# Patient Record
Sex: MALE | Race: WHITE | NOT HISPANIC OR LATINO | Employment: OTHER | URBAN - METROPOLITAN AREA
[De-identification: names, ages, dates, MRNs, and addresses within clinical notes are randomized per-mention and may not be internally consistent; named-entity substitution may affect disease eponyms.]

---

## 2019-11-27 ENCOUNTER — APPOINTMENT (EMERGENCY)
Dept: CT IMAGING | Facility: HOSPITAL | Age: 74
DRG: 079 | End: 2019-11-27
Payer: MEDICARE

## 2019-11-27 ENCOUNTER — HOSPITAL ENCOUNTER (INPATIENT)
Facility: HOSPITAL | Age: 74
LOS: 1 days | Discharge: HOME | DRG: 079 | End: 2019-11-29
Attending: EMERGENCY MEDICINE | Admitting: HOSPITALIST
Payer: MEDICARE

## 2019-11-27 DIAGNOSIS — I65.21 STENOSIS OF RIGHT CAROTID ARTERY: ICD-10-CM

## 2019-11-27 DIAGNOSIS — G45.9 TIA (TRANSIENT ISCHEMIC ATTACK): Primary | ICD-10-CM

## 2019-11-27 DIAGNOSIS — I67.4 HYPERTENSIVE ENCEPHALOPATHY: ICD-10-CM

## 2019-11-27 DIAGNOSIS — Z72.0 SMOKELESS TOBACCO USE: ICD-10-CM

## 2019-11-27 DIAGNOSIS — I10 HYPERTENSION: ICD-10-CM

## 2019-11-27 DIAGNOSIS — R20.0 RIGHT SIDED NUMBNESS: ICD-10-CM

## 2019-11-27 DIAGNOSIS — I10 ESSENTIAL HYPERTENSION: Chronic | ICD-10-CM

## 2019-11-27 PROBLEM — R79.89 ELEVATED SERUM CREATININE: Status: ACTIVE | Noted: 2019-11-27

## 2019-11-27 LAB
ANION GAP SERPL CALCULATED.3IONS-SCNC: 7 MMOL/L (ref 4–13)
APTT PPP: 29 SECONDS (ref 23–37)
BASOPHILS # BLD AUTO: 0.1 THOUSANDS/ΜL (ref 0–0.1)
BASOPHILS NFR BLD AUTO: 1 % (ref 0–1)
BUN SERPL-MCNC: 20 MG/DL (ref 5–25)
CALCIUM SERPL-MCNC: 9.1 MG/DL (ref 8.3–10.1)
CHLORIDE SERPL-SCNC: 103 MMOL/L (ref 100–108)
CO2 SERPL-SCNC: 32 MMOL/L (ref 21–32)
CREAT SERPL-MCNC: 1.76 MG/DL (ref 0.6–1.3)
EOSINOPHIL # BLD AUTO: 0.41 THOUSAND/ΜL (ref 0–0.61)
EOSINOPHIL NFR BLD AUTO: 4 % (ref 0–6)
ERYTHROCYTE [DISTWIDTH] IN BLOOD BY AUTOMATED COUNT: 13.9 % (ref 11.6–15.1)
GFR SERPL CREATININE-BSD FRML MDRD: 38 ML/MIN/1.73SQ M
GLUCOSE SERPL-MCNC: 133 MG/DL (ref 65–140)
HCT VFR BLD AUTO: 48.6 % (ref 36.5–49.3)
HGB BLD-MCNC: 16.1 G/DL (ref 12–17)
IMM GRANULOCYTES # BLD AUTO: 0.06 THOUSAND/UL (ref 0–0.2)
IMM GRANULOCYTES NFR BLD AUTO: 1 % (ref 0–2)
INR PPP: 0.85 (ref 0.84–1.19)
LYMPHOCYTES # BLD AUTO: 1.75 THOUSANDS/ΜL (ref 0.6–4.47)
LYMPHOCYTES NFR BLD AUTO: 19 % (ref 14–44)
MAGNESIUM SERPL-MCNC: 2.1 MG/DL (ref 1.6–2.6)
MCH RBC QN AUTO: 29.9 PG (ref 26.8–34.3)
MCHC RBC AUTO-ENTMCNC: 33.1 G/DL (ref 31.4–37.4)
MCV RBC AUTO: 90 FL (ref 82–98)
MONOCYTES # BLD AUTO: 0.84 THOUSAND/ΜL (ref 0.17–1.22)
MONOCYTES NFR BLD AUTO: 9 % (ref 4–12)
NEUTROPHILS # BLD AUTO: 6.13 THOUSANDS/ΜL (ref 1.85–7.62)
NEUTS SEG NFR BLD AUTO: 66 % (ref 43–75)
NRBC BLD AUTO-RTO: 0 /100 WBCS
PLATELET # BLD AUTO: 286 THOUSANDS/UL (ref 149–390)
PMV BLD AUTO: 9.5 FL (ref 8.9–12.7)
POTASSIUM SERPL-SCNC: 4 MMOL/L (ref 3.5–5.3)
PROTHROMBIN TIME: 11.1 SECONDS (ref 11.6–14.5)
RBC # BLD AUTO: 5.38 MILLION/UL (ref 3.88–5.62)
SODIUM SERPL-SCNC: 142 MMOL/L (ref 136–145)
TROPONIN I SERPL-MCNC: <0.02 NG/ML
WBC # BLD AUTO: 9.29 THOUSAND/UL (ref 4.31–10.16)

## 2019-11-27 PROCEDURE — 99220 PR INITIAL OBSERVATION CARE/DAY 70 MINUTES: CPT | Performed by: PHYSICIAN ASSISTANT

## 2019-11-27 PROCEDURE — 85025 COMPLETE CBC W/AUTO DIFF WBC: CPT | Performed by: EMERGENCY MEDICINE

## 2019-11-27 PROCEDURE — 84484 ASSAY OF TROPONIN QUANT: CPT | Performed by: EMERGENCY MEDICINE

## 2019-11-27 PROCEDURE — 70496 CT ANGIOGRAPHY HEAD: CPT

## 2019-11-27 PROCEDURE — 85730 THROMBOPLASTIN TIME PARTIAL: CPT | Performed by: EMERGENCY MEDICINE

## 2019-11-27 PROCEDURE — 70498 CT ANGIOGRAPHY NECK: CPT

## 2019-11-27 PROCEDURE — 99284 EMERGENCY DEPT VISIT MOD MDM: CPT | Performed by: EMERGENCY MEDICINE

## 2019-11-27 PROCEDURE — 93005 ELECTROCARDIOGRAM TRACING: CPT

## 2019-11-27 PROCEDURE — 99285 EMERGENCY DEPT VISIT HI MDM: CPT

## 2019-11-27 PROCEDURE — 83735 ASSAY OF MAGNESIUM: CPT | Performed by: EMERGENCY MEDICINE

## 2019-11-27 PROCEDURE — 85610 PROTHROMBIN TIME: CPT | Performed by: EMERGENCY MEDICINE

## 2019-11-27 PROCEDURE — 1124F ACP DISCUSS-NO DSCNMKR DOCD: CPT | Performed by: PHYSICIAN ASSISTANT

## 2019-11-27 PROCEDURE — 36415 COLL VENOUS BLD VENIPUNCTURE: CPT | Performed by: EMERGENCY MEDICINE

## 2019-11-27 PROCEDURE — 80048 BASIC METABOLIC PNL TOTAL CA: CPT | Performed by: EMERGENCY MEDICINE

## 2019-11-27 RX ORDER — ASPIRIN 325 MG
325 TABLET ORAL ONCE
Status: COMPLETED | OUTPATIENT
Start: 2019-11-27 | End: 2019-11-27

## 2019-11-27 RX ORDER — AMLODIPINE BESYLATE 5 MG/1
5 TABLET ORAL ONCE
Status: COMPLETED | OUTPATIENT
Start: 2019-11-27 | End: 2019-11-27

## 2019-11-27 RX ORDER — ALPRAZOLAM 0.25 MG/1
0.25 TABLET ORAL
Status: DISCONTINUED | OUTPATIENT
Start: 2019-11-27 | End: 2019-11-28

## 2019-11-27 RX ORDER — AMLODIPINE BESYLATE 5 MG/1
5 TABLET ORAL DAILY
COMMUNITY
End: 2019-11-29 | Stop reason: HOSPADM

## 2019-11-27 RX ORDER — HEPARIN SODIUM 5000 [USP'U]/ML
5000 INJECTION, SOLUTION INTRAVENOUS; SUBCUTANEOUS EVERY 8 HOURS SCHEDULED
Status: DISCONTINUED | OUTPATIENT
Start: 2019-11-27 | End: 2019-11-29 | Stop reason: HOSPADM

## 2019-11-27 RX ORDER — ASPIRIN 81 MG/1
81 TABLET, CHEWABLE ORAL DAILY
Status: DISCONTINUED | OUTPATIENT
Start: 2019-11-28 | End: 2019-11-29 | Stop reason: HOSPADM

## 2019-11-27 RX ORDER — ATORVASTATIN CALCIUM 40 MG/1
40 TABLET, FILM COATED ORAL EVERY EVENING
Status: DISCONTINUED | OUTPATIENT
Start: 2019-11-27 | End: 2019-11-29 | Stop reason: HOSPADM

## 2019-11-27 RX ORDER — ONDANSETRON 2 MG/ML
4 INJECTION INTRAMUSCULAR; INTRAVENOUS EVERY 6 HOURS PRN
Status: DISCONTINUED | OUTPATIENT
Start: 2019-11-27 | End: 2019-11-29 | Stop reason: HOSPADM

## 2019-11-27 RX ORDER — ALPRAZOLAM 0.25 MG/1
TABLET ORAL
COMMUNITY

## 2019-11-27 RX ORDER — AMLODIPINE BESYLATE 5 MG/1
5 TABLET ORAL DAILY
Status: DISCONTINUED | OUTPATIENT
Start: 2019-11-28 | End: 2019-11-28

## 2019-11-27 RX ORDER — ACETAMINOPHEN 325 MG/1
650 TABLET ORAL EVERY 6 HOURS PRN
Status: DISCONTINUED | OUTPATIENT
Start: 2019-11-27 | End: 2019-11-29 | Stop reason: HOSPADM

## 2019-11-27 RX ADMIN — IODIXANOL 100 ML: 320 INJECTION, SOLUTION INTRAVASCULAR at 19:44

## 2019-11-27 RX ADMIN — ASPIRIN 325 MG ORAL TABLET 325 MG: 325 PILL ORAL at 20:50

## 2019-11-27 RX ADMIN — AMLODIPINE BESYLATE 5 MG: 5 TABLET ORAL at 20:50

## 2019-11-27 NOTE — ED PROVIDER NOTES
History  Chief Complaint   Patient presents with    Numbness     Pt c/o intermittent right sided numbness and chest pain since monday  Denies CP and numbness at this time  History provided by:  Patient   used: No    29-year-old male with history of hypertension on amlodipine only presented for evaluation of intermittent symptoms over the last few days involving paresthesias of the right face, numbness and weakness of the right arm and also the right leg as well some intermittent chest pain  States he has had for significant episodes in the last 2 days  Notes he had an episode in the car on the drive down here  His symptoms have since resolved  Significantly hypertensive on arrival   No headaches, visual changes  Neurologically intact on initial exam   Concern for recurrent TIA symptoms  Will CT head and neck, check EKG, labs and likely admit for further workup  Prior to Admission Medications   Prescriptions Last Dose Informant Patient Reported? Taking? ALPRAZolam (XANAX) 0 25 mg tablet 11/27/2019 at Unknown time  Yes Yes   Sig: Take by mouth daily at bedtime as needed for anxiety   amLODIPine (NORVASC) 5 mg tablet 11/26/2019 at Unknown time  Yes Yes   Sig: Take 5 mg by mouth daily      Facility-Administered Medications: None       Past Medical History:   Diagnosis Date    Anxiety     Hypertension        History reviewed  No pertinent surgical history  History reviewed  No pertinent family history  I have reviewed and agree with the history as documented  Social History     Tobacco Use    Smoking status: Never Smoker    Smokeless tobacco: Current User     Types: Chew   Substance Use Topics    Alcohol use: Never     Frequency: Never    Drug use: Not on file        Review of Systems   Constitutional: Negative for activity change, appetite change, fatigue and fever  Eyes: Negative for photophobia and visual disturbance     Respiratory: Negative for chest tightness and shortness of breath  Cardiovascular: Positive for chest pain  Negative for palpitations and leg swelling  Gastrointestinal: Negative for abdominal pain, nausea and vomiting  Musculoskeletal: Negative for back pain and neck pain  Skin: Negative for color change and wound  Neurological: Positive for weakness and numbness  Negative for dizziness, seizures and headaches  All other systems reviewed and are negative  Physical Exam  Physical Exam   Constitutional: He is oriented to person, place, and time  He appears well-developed and well-nourished  No distress  HENT:   Head: Normocephalic  Mouth/Throat: Oropharynx is clear and moist    Eyes: Pupils are equal, round, and reactive to light  EOM are normal    Neck: Normal range of motion  Neck supple  Cardiovascular: Normal rate, regular rhythm, normal heart sounds and intact distal pulses  Pulmonary/Chest: Effort normal and breath sounds normal  He exhibits no tenderness  Abdominal: Soft  He exhibits no distension  There is no tenderness  Musculoskeletal: Normal range of motion  He exhibits no edema or tenderness  Neurological: He is alert and oriented to person, place, and time  No cranial nerve deficit or sensory deficit  He exhibits normal muscle tone  Coordination normal    Skin: Skin is warm and dry  No rash noted  Psychiatric: He has a normal mood and affect  His behavior is normal    Nursing note and vitals reviewed        Vital Signs  ED Triage Vitals [11/27/19 1840]   Temperature Pulse Respirations Blood Pressure SpO2   97 7 °F (36 5 °C) 78 18 (!) 234/110 98 %      Temp Source Heart Rate Source Patient Position - Orthostatic VS BP Location FiO2 (%)   Oral Monitor Lying Left arm --      Pain Score       No Pain           Vitals:    11/28/19 0815 11/28/19 1215 11/28/19 1615 11/28/19 1700   BP: 169/76 143/74 (!) 177/86 164/80   Pulse: 58 57 61    Patient Position - Orthostatic VS: Sitting Sitting Sitting Sitting         Visual Acuity  Visual Acuity      Most Recent Value   L Pupil Size (mm)  3   R Pupil Size (mm)  3   L Pupil Shape  Round   R Pupil Shape  Round          ED Medications  Medications   acetaminophen (TYLENOL) tablet 650 mg (has no administration in time range)   ondansetron (ZOFRAN) injection 4 mg (has no administration in time range)   atorvastatin (LIPITOR) tablet 40 mg (40 mg Oral Given 11/28/19 1713)   aspirin chewable tablet 81 mg (81 mg Oral Refused 11/28/19 0830)   heparin (porcine) subcutaneous injection 5,000 Units (5,000 Units Subcutaneous Not Given 11/28/19 1344)   ALPRAZolam (XANAX) tablet 0 25 mg (0 25 mg Oral Given 11/28/19 1140)   hydrALAZINE (APRESOLINE) injection 5 mg (has no administration in time range)   nicotine (NICODERM CQ) 21 mg/24 hr TD 24 hr patch 21 mg (21 mg Transdermal Not Given 11/28/19 1446)   iodixanol (VISIPAQUE) 320 MG/ML injection 100 mL (100 mL Intravenous Given 11/27/19 1944)   aspirin tablet 325 mg (325 mg Oral Given 11/27/19 2050)   amLODIPine (NORVASC) tablet 5 mg (5 mg Oral Given 11/27/19 2050)       Diagnostic Studies  Results Reviewed     Procedure Component Value Units Date/Time    Troponin I [[id number]]  (Normal) Collected:  11/27/19 1900    Lab Status:  Final result Specimen:  Blood from Arm, Right Updated:  11/27/19 1932     Troponin I <0 02 ng/mL     Protime-INR [[id number]]  (Abnormal) Collected:  11/27/19 1900    Lab Status:  Final result Specimen:  Blood from Arm, Right Updated:  11/27/19 1925     Protime 11 1 seconds      INR 0 85    APTT [[id number]]  (Normal) Collected:  11/27/19 1900    Lab Status:  Final result Specimen:  Blood from Arm, Right Updated:  11/27/19 1925     PTT 29 seconds     Basic metabolic panel [[id number]]  (Abnormal) Collected:  11/27/19 1900    Lab Status:  Final result Specimen:  Blood from Arm, Right Updated:  11/27/19 1924     Sodium 142 mmol/L      Potassium 4 0 mmol/L      Chloride 103 mmol/L      CO2 32 mmol/L      ANION GAP 7 mmol/L      BUN 20 mg/dL      Creatinine 1 76 mg/dL      Glucose 133 mg/dL      Calcium 9 1 mg/dL      eGFR 38 ml/min/1 73sq m     Narrative:       National Kidney Disease Foundation guidelines for Chronic Kidney Disease (CKD):     Stage 1 with normal or high GFR (GFR > 90 mL/min/1 73 square meters)    Stage 2 Mild CKD (GFR = 60-89 mL/min/1 73 square meters)    Stage 3A Moderate CKD (GFR = 45-59 mL/min/1 73 square meters)    Stage 3B Moderate CKD (GFR = 30-44 mL/min/1 73 square meters)    Stage 4 Severe CKD (GFR = 15-29 mL/min/1 73 square meters)    Stage 5 End Stage CKD (GFR <15 mL/min/1 73 square meters)  Note: GFR calculation is accurate only with a steady state creatinine    Magnesium [[id number]]  (Normal) Collected:  11/27/19 1900    Lab Status:  Final result Specimen:  Blood from Arm, Right Updated:  11/27/19 1924     Magnesium 2 1 mg/dL     CBC and differential [[id number]] Collected:  11/27/19 1900    Lab Status:  Final result Specimen:  Blood from Arm, Right Updated:  11/27/19 1913     WBC 9 29 Thousand/uL      RBC 5 38 Million/uL      Hemoglobin 16 1 g/dL      Hematocrit 48 6 %      MCV 90 fL      MCH 29 9 pg      MCHC 33 1 g/dL      RDW 13 9 %      MPV 9 5 fL      Platelets 054 Thousands/uL      nRBC 0 /100 WBCs      Neutrophils Relative 66 %      Immat GRANS % 1 %      Lymphocytes Relative 19 %      Monocytes Relative 9 %      Eosinophils Relative 4 %      Basophils Relative 1 %      Neutrophils Absolute 6 13 Thousands/µL      Immature Grans Absolute 0 06 Thousand/uL      Lymphocytes Absolute 1 75 Thousands/µL      Monocytes Absolute 0 84 Thousand/µL      Eosinophils Absolute 0 41 Thousand/µL      Basophils Absolute 0 10 Thousands/µL                  CTA head and neck with and without contrast   Final Result by Pleasant Goodpasture, DO (11/27 1959)      CT brain: No acute intracranial pathology  CT angiography: Approximately 65-70% focal stenosis at the origin of the right internal carotid artery        No intracranial atherosclerotic or occlusive disease  Workstation performed: [id number]         MRI Inpatient Order    (Results Pending)              Procedures  ECG 12 Lead Documentation Only  Date/Time: 11/27/2019 6:55 PM  Performed by: Juliano Tracy MD  Authorized by: Juliano Tracy MD     Indications / Diagnosis:  TIA  ECG reviewed by me, the ED Provider: yes    Patient location:  ED  Previous ECG:     Previous ECG:  Unavailable  Rate:     ECG rate:  81  Rhythm:     Rhythm: sinus rhythm    Ectopy:     Ectopy: none    QRS:     QRS axis:  Normal  Conduction:     Conduction: normal    ST segments:     ST segments:  Normal  T waves:     T waves: normal             ED Course             Stroke Assessment     Row Name 11/27/19 2034             NIH Stroke Scale    Interval  Baseline      Level of Consciousness (1a )  0      LOC Questions (1b )  0      LOC Commands (1c )  0      Best Gaze (2 )  0      Visual (3 )  0      Facial Palsy (4 )  0      Motor Arm, Left (5a )  0      Motor Arm, Right (5b )  0      Motor Leg, Left (6a )  0      Motor Leg, Right (6b )  0      Limb Ataxia (7 )  0      Sensory (8 )  0      Best Language (9 )  0      Dysarthria (10 )  0      Extinction and Inattention (11 ) (Formerly Neglect)  0      Total  0          First Filed Value   TPA Decision  Patient not a TPA candidate  Patient is not a candidate options  Symptoms resolved/clearly non disabling  MDM  Number of Diagnoses or Management Options  Hypertension: new and requires workup  TIA (transient ischemic attack): new and requires workup  Diagnosis management comments: 24-year-old male with a history of hypertension presented significantly hypertensive having had recurrent episodes concerning for TIAs involving right-sided facial paresthesias and right upper and lower extremity weakness and sensory loss  No symptoms on arrival   Hypertension did improve spontaneously  EKG, lab work unremarkable    CT of the head neck notable for right-sided carotid stenosis which would not be consistent with his recent symptoms  Admitted for further workup, neurology consultation  Amount and/or Complexity of Data Reviewed  Clinical lab tests: ordered and reviewed  Tests in the radiology section of CPT®: ordered and reviewed  Discuss the patient with other providers: yes  Independent visualization of images, tracings, or specimens: yes    Patient Progress  Patient progress: improved      Disposition  Final diagnoses:   TIA (transient ischemic attack)   Hypertension     Time reflects when diagnosis was documented in both MDM as applicable and the Disposition within this note     Time User Action Codes Description Comment    11/27/2019  8:34 PM Binta CLAIRE Add [G45 9] TIA (transient ischemic attack)     11/27/2019  8:34 PM Arelis Wesley Add [I10] Hypertension     11/27/2019  9:30 PM Britany Malloy Modify [I10] Hypertension     11/27/2019  9:30 PM Britany Malloy Add [R20 0] Right sided numbness       ED Disposition     ED Disposition Condition Date/Time Comment    Admit Stable Wed Nov 27, 2019  8:34 PM Case was discussed with Cesar Olvera and the patient's admission status was agreed to be Admission Status: observation status to the service of Dr Leslye Moore   Follow-up Information    None         Current Discharge Medication List      CONTINUE these medications which have NOT CHANGED    Details   ALPRAZolam (XANAX) 0 25 mg tablet Take by mouth daily at bedtime as needed for anxiety      amLODIPine (NORVASC) 5 mg tablet Take 5 mg by mouth daily           No discharge procedures on file      ED Provider  Electronically Signed by           Lee Ann Lucas MD  11/28/19 8131

## 2019-11-28 LAB
ANION GAP SERPL CALCULATED.3IONS-SCNC: 7 MMOL/L (ref 4–13)
BUN SERPL-MCNC: 23 MG/DL (ref 5–25)
CALCIUM SERPL-MCNC: 9.2 MG/DL (ref 8.3–10.1)
CHLORIDE SERPL-SCNC: 105 MMOL/L (ref 100–108)
CHOLEST SERPL-MCNC: 204 MG/DL (ref 50–200)
CO2 SERPL-SCNC: 29 MMOL/L (ref 21–32)
CREAT SERPL-MCNC: 1.71 MG/DL (ref 0.6–1.3)
ERYTHROCYTE [DISTWIDTH] IN BLOOD BY AUTOMATED COUNT: 13.9 % (ref 11.6–15.1)
EST. AVERAGE GLUCOSE BLD GHB EST-MCNC: 117 MG/DL
EST. AVERAGE GLUCOSE BLD GHB EST-MCNC: 126 MG/DL
GFR SERPL CREATININE-BSD FRML MDRD: 39 ML/MIN/1.73SQ M
GLUCOSE SERPL-MCNC: 127 MG/DL (ref 65–140)
HBA1C MFR BLD: 5.7 % (ref 4.2–6.3)
HBA1C MFR BLD: 6 % (ref 4.2–6.3)
HCT VFR BLD AUTO: 48 % (ref 36.5–49.3)
HDLC SERPL-MCNC: 47 MG/DL
HGB BLD-MCNC: 15.2 G/DL (ref 12–17)
LDLC SERPL CALC-MCNC: 135 MG/DL (ref 0–100)
MCH RBC QN AUTO: 29 PG (ref 26.8–34.3)
MCHC RBC AUTO-ENTMCNC: 31.7 G/DL (ref 31.4–37.4)
MCV RBC AUTO: 92 FL (ref 82–98)
PLATELET # BLD AUTO: 262 THOUSANDS/UL (ref 149–390)
PMV BLD AUTO: 9.3 FL (ref 8.9–12.7)
POTASSIUM SERPL-SCNC: 4.4 MMOL/L (ref 3.5–5.3)
RBC # BLD AUTO: 5.24 MILLION/UL (ref 3.88–5.62)
SODIUM SERPL-SCNC: 141 MMOL/L (ref 136–145)
TRIGL SERPL-MCNC: 108 MG/DL
WBC # BLD AUTO: 9.15 THOUSAND/UL (ref 4.31–10.16)

## 2019-11-28 PROCEDURE — 99232 SBSQ HOSP IP/OBS MODERATE 35: CPT | Performed by: HOSPITALIST

## 2019-11-28 PROCEDURE — 80061 LIPID PANEL: CPT | Performed by: PHYSICIAN ASSISTANT

## 2019-11-28 PROCEDURE — 85027 COMPLETE CBC AUTOMATED: CPT | Performed by: PHYSICIAN ASSISTANT

## 2019-11-28 PROCEDURE — 83036 HEMOGLOBIN GLYCOSYLATED A1C: CPT | Performed by: PHYSICIAN ASSISTANT

## 2019-11-28 PROCEDURE — 80048 BASIC METABOLIC PNL TOTAL CA: CPT | Performed by: PHYSICIAN ASSISTANT

## 2019-11-28 PROCEDURE — 83036 HEMOGLOBIN GLYCOSYLATED A1C: CPT | Performed by: INTERNAL MEDICINE

## 2019-11-28 PROCEDURE — 99223 1ST HOSP IP/OBS HIGH 75: CPT | Performed by: PHYSICIAN ASSISTANT

## 2019-11-28 RX ORDER — HYDRALAZINE HYDROCHLORIDE 20 MG/ML
5 INJECTION INTRAMUSCULAR; INTRAVENOUS EVERY 6 HOURS PRN
Status: DISCONTINUED | OUTPATIENT
Start: 2019-11-28 | End: 2019-11-28

## 2019-11-28 RX ORDER — NICOTINE 21 MG/24HR
21 PATCH, TRANSDERMAL 24 HOURS TRANSDERMAL DAILY
Status: DISCONTINUED | OUTPATIENT
Start: 2019-11-28 | End: 2019-11-29 | Stop reason: HOSPADM

## 2019-11-28 RX ORDER — HYDRALAZINE HYDROCHLORIDE 20 MG/ML
5 INJECTION INTRAMUSCULAR; INTRAVENOUS EVERY 4 HOURS PRN
Status: DISCONTINUED | OUTPATIENT
Start: 2019-11-28 | End: 2019-11-29 | Stop reason: HOSPADM

## 2019-11-28 RX ORDER — ALPRAZOLAM 0.25 MG/1
0.25 TABLET ORAL 2 TIMES DAILY PRN
Status: DISCONTINUED | OUTPATIENT
Start: 2019-11-28 | End: 2019-11-29 | Stop reason: HOSPADM

## 2019-11-28 RX ADMIN — HEPARIN SODIUM 5000 UNITS: 5000 INJECTION INTRAVENOUS; SUBCUTANEOUS at 21:39

## 2019-11-28 RX ADMIN — ALPRAZOLAM 0.25 MG: 0.25 TABLET ORAL at 23:40

## 2019-11-28 RX ADMIN — ATORVASTATIN CALCIUM 40 MG: 40 TABLET, FILM COATED ORAL at 17:13

## 2019-11-28 RX ADMIN — ALPRAZOLAM 0.25 MG: 0.25 TABLET ORAL at 11:40

## 2019-11-28 NOTE — ASSESSMENT & PLAN NOTE
· BP elevated on admission   Improved after home amlodipine  · Permissive HTN given possible CVA - Norvasc continued with hold parameters < 150 mmHg

## 2019-11-28 NOTE — PLAN OF CARE
Problem: NEUROSENSORY - ADULT  Goal: Achieves stable or improved neurological status  Description  INTERVENTIONS  - Monitor and report changes in neurological status  - Monitor vital signs such as temperature, blood pressure, glucose, and any other labs ordered   - Initiate measures to prevent increased intracranial pressure  - Monitor for seizure activity and implement precautions if appropriate      Outcome: Progressing     Problem: CARDIOVASCULAR - ADULT  Goal: Absence of cardiac dysrhythmias or at baseline rhythm  Description  INTERVENTIONS:  - Continuous cardiac monitoring, vital signs, obtain 12 lead EKG if ordered  - Administer antiarrhythmic and heart rate control medications as ordered  - Monitor electrolytes and administer replacement therapy as ordered  Outcome: Progressing     Problem: Neurological Deficit  Goal: Neurological status is stable or improving  Description  Interventions:  - Monitor and assess patient's level of consciousness, motor function, sensory function, and level of assistance needed for ADLs  - Monitor and report changes from baseline  Collaborate with interdisciplinary team to initiate plan and implement interventions as ordered  - Provide and maintain a safe environment  - Consider seizure precautions  - Consider fall precautions  - Consider aspiration precautions  - Consider bleeding precautions  Outcome: Progressing     Problem: Activity Intolerance/Impaired Mobility  Goal: Mobility/activity is maintained at optimum level for patient  Description  Interventions:  - Assess and monitor patient  barriers to mobility and need for assistive/adaptive devices  - Assess patient's emotional response to limitations  - Collaborate with interdisciplinary team and initiate plans and interventions as ordered  - Encourage independent activity per ability   - Maintain proper body alignment  - Perform active/passive rom as tolerated/ordered  - Plan activities to conserve energy    - Turn patient as appropriate  Outcome: Progressing     Problem: Communication Impairment  Goal: Ability to express needs and understand communication  Description  Assess patient's communication skills and ability to understand information  Patient will demonstrate use of effective communication techniques, alternative methods of communication and understanding even if not able to speak  - Encourage communication and provide alternate methods of communication as needed  - Collaborate with case management/ for discharge needs  - Include patient/family/caregiver in decisions related to communication    Outcome: Progressing

## 2019-11-28 NOTE — UTILIZATION REVIEW
Initial Clinical Review    Admission: Date/Time/Statement: 11/27/2019 2035 Observation and Changed 11/28/2019 1317 Inpatient re: patient will need > 2 midnight stay for continued neurologic evaluation with symptoms concerning for TIA  Start   Ordered   11/28/19 1318  Inpatient Admission Once     Transfer Service: Hospitalist       Question Answer Comment   Admitting Physician Estelita Napier of [facility]    Estimated length of stay More than 2 Midnights    Certification I certify that inpatient services are medically necessary for this patient for a duration of greater than two midnights  See H&P and MD Progress Notes for additional information about the patient's course of treatment  11/28/19 1317         ED Arrival Information     Expected Arrival Acuity Means of Arrival Escorted By Service Admission Type    - 11/27/2019 18:31 Emergent Walk-In Spouse Hospitalist Emergency    Arrival Complaint    chest pain right sided numbness facial numbness        Chief Complaint   Patient presents with    Numbness     Pt c/o intermittent right sided numbness and chest pain since monday  Denies CP and numbness at this time  Assessment/Plan: 72 y o  male with a history of HTN who presents with right sided numbness  Patient reports he started with sudden right-sided numbness today and lasted around 5-6 minutes  He stated that during this time his right leg was numb, he could not move his right arm at all, and his face felt like he went to the dentist and got numbed up  He states that his symptoms then returned and his wife brought him to the hospital and he stated that the symptoms occurred again on the ride here  Right sided numbness  Assessment & Plan  · Multiple episodes prior to admission  Patient states that his right leg was numb, he could not move his right arm, and his right face felt like he went to the dentist and got numbed   On exam he has some coordination defects with finger-to-nose testing, but this is mild  Strength 5/5 in upper and lower extremities  CTA significant for R ICA stenosis 65%-70%, unclear significance at this time  ? Admit patient to med/surg under observation status   ? Stroke pathway  ? Permissive HTN for now      Essential hypertension  Assessment & Plan  · BP elevated on admission  Improved after home amlodipine  ? Permissive HTN given possible CVA - Norvasc continued with hold parameters < 150 mmHg     Elevated serum creatinine  Assessment & Plan  · Unclear baseline, no prior to compare  Could be baseline if he has long standing hypertension   ? Trend  ?  Consider nephrology consultation   ED Triage Vitals [11/27/19 1840]   Temperature Pulse Respirations Blood Pressure SpO2   97 7 °F (36 5 °C) 78 18 (!) 234/110 98 %      Temp Source Heart Rate Source Patient Position - Orthostatic VS BP Location FiO2 (%)   Oral Monitor Lying Left arm --      Pain Score       No Pain        Wt Readings from Last 1 Encounters:   11/27/19 62 9 kg (138 lb 9 6 oz)     Additional Vital Signs:   11/28/19 0415  97 5 °F (36 4 °C)  58    143/75  98 %  None (Room air)  Sitting   11/28/19 0215  98 4 °F (36 9 °C)  60  18  152/78  97 %  None (Room air)  Lying   11/28/19 0015  98 2 °F (36 8 °C)  61  18  120/58  98 %  None (Room air)  Lying   11/27/19 2315  98 2 °F (36 8 °C)  60    154/73  98 %  None (Room air)  Sitting   11/27/19 2215    57    170/84      Sitting   11/27/19 2115  97 8 °F (36 6 °C)  63  17  180/70Abnormal   99 %  None (Room air)  Sitting   11/27/19 2030    64    177/83Abnormal   96 %  None (Room air)  Sitting   11/27/19 1848    75  18  202/95Abnormal   97 %  None (Room air)       Date and Time Eye Opening Best Verbal Response Best Motor Response [place] Coma Scale Score   11/28/19 0606 4 5 6 15   11/28/19 0415 4 5 6 15   11/28/19 0215 4 5 6 15   11/28/19 0015 4 5 6 15   11/27/19 2315 4 5 6 15   11/27/19 2215 4 5 6 15   11/27/19 2115 4 5 6 15   11/27/19 1843 4 5 6 15 Pertinent Labs/Diagnostic Test Results:   Results from last 7 days   Lab Units 11/28/19  0451 11/27/19  1900   WBC Thousand/uL 9 15 9 29   HEMOGLOBIN g/dL 15 2 16 1   HEMATOCRIT % 48 0 48 6   PLATELETS Thousands/uL 262 286   NEUTROS ABS Thousands/µL  --  6 13     Results from last 7 days   Lab Units 11/28/19  0451 11/27/19  1900   SODIUM mmol/L 141 142   POTASSIUM mmol/L 4 4 4 0   CHLORIDE mmol/L 105 103   CO2 mmol/L 29 32   ANION GAP mmol/L 7 7   BUN mg/dL 23 20   CREATININE mg/dL 1 71* 1 76*   EGFR ml/min/1 73sq m 39 38   CALCIUM mg/dL 9 2 9 1   MAGNESIUM mg/dL  --  2 1     Results from last 7 days   Lab Units 11/28/19  0451 11/27/19  1900   GLUCOSE RANDOM mg/dL 127 133     Results from last 7 days   Lab Units 11/27/19  1900   TROPONIN I ng/mL <0 02     Results from last 7 days   Lab Units 11/27/19  1900   PROTIME seconds 11 1*   INR  0 85   PTT seconds 29     ED Treatment:   Medication Administration from 11/27/2019 1831 to 11/27/2019 2101       Date/Time Order Dose Route Action Comments     11/27/2019 2050 aspirin tablet 325 mg 325 mg Oral Given      11/27/2019 2050 amLODIPine (NORVASC) tablet 5 mg 5 mg Oral Given         Past Medical History:   Diagnosis Date    Anxiety     Hypertension      Present on Admission:   Right sided numbness   Essential hypertension   Elevated serum creatinine      Admitting Diagnosis: TIA (transient ischemic attack) [G45 9]  Chest pain [R07 9]  Hypertension [I10]  Age/Sex: 72 y o  male  Admission Orders: 11/27/2019 2035 Observation and changed 11/28/2019 1317 Inpatient   Scheduled Medications:  Medications:  amLODIPine 5 mg Oral Daily   aspirin 81 mg Oral Daily   atorvastatin 40 mg Oral QPM   heparin (porcine) 5,000 Units Subcutaneous Q8H [address]     Continuous IV Infusions: none     PRN Meds: not used   acetaminophen 650 mg Oral Q6H PRN   ALPRAZolam 0 25 mg Oral HS PRN   ondansetron 4 mg Intravenous Q6H PRN       IP CONSULT TO NEUROLOGY  telemetry      Network Utilization Review Department  [email or web address]  ATTENTION: Please call with any questions or concerns to [phone] and carefully listen to the prompts so that you are directed to the right person  All voicemails are confidential   Chhaya Peacock all requests for admission clinical reviews, approved or denied determinations and any other requests to dedicated fax number below belonging to the campus where the patient is receiving treatment    FACILITY NAME UR FAX NUMBER   ADMISSION DENIALS (Administrative/Medical Necessity) [address] [facility] (Maternity/NICU/Pediatrics) [phone]   [facility] [id number] [place] [address] [address] [facility] [phone]   [facility] [address] [facility] [phone]   Naveed Mckeon [address] [facility] [address] [place] [address] [phone]

## 2019-11-28 NOTE — ASSESSMENT & PLAN NOTE
· BP elevated on admission   Improved after home amlodipine  · Hold antihypertensive meds  · 5 mg hydralazine p r n  to be administered for systolic blood pressure greater than 160

## 2019-11-28 NOTE — H&P
[facility] Internal Medicine  H&P- Matthew Trinh 1945, 72 y o  male MRN: [id number]    Unit/Bed#: S -01 Encounter: [id number]    Primary Care Provider: DAVID Valle   Date and time admitted to hospital: 11/27/2019  6:34 PM        * Right sided numbness  Assessment & Plan  · Multiple episodes prior to admission  Patient states that his right leg was numb, he could not move his right arm, and his right face felt like he went to the dentist and got numbed  On exam he has some coordination defects with finger-to-nose testing, but this is mild  Strength 5/5 in upper and lower extremities  CTA significant for R ICA stenosis 65%-70%, unclear significance at this time  · Admit patient to med/surg under observation status   · Stroke pathway  · Permissive HTN for now     Essential hypertension  Assessment & Plan  · BP elevated on admission  Improved after home amlodipine  · Permissive HTN given possible CVA - Norvasc continued with hold parameters < 150 mmHg    Elevated serum creatinine  Assessment & Plan  · Unclear baseline, no prior to compare  Could be baseline if he has long standing hypertension   · Trend  · Consider nephrology consultation       VTE Prophylaxis: Enoxaparin (Lovenox)  / sequential compression device   Code Status: Full Code   POLST: POLST form is not discussed and not completed at this time  Discussion with family: Discussed with patient's wife and son at bedside     Anticipated Length of Stay:  Patient will be admitted on an Observation basis with an anticipated length of stay of  Less than 2 midnights  Justification for Hospital Stay: As per above assessment and plan     Total Time for Visit, including Counseling / Coordination of Care: 1 hour  Greater than 50% of this total time spent on direct patient counseling and coordination of care      Chief Complaint:   Right Sided Weakness    History of Present Illness:    Matthew Trinh is a 72 y o  male with a history of HTN who presents with right sided numbness  Patient reports he started with sudden right-sided numbness today and lasted around 5-6 minutes  He stated that during this time his right leg was numb, he could not move his right arm at all, and his face felt like he went to the dentist and got numbed up  He states that his symptoms then returned and his wife brought him to the hospital and he stated that the symptoms occurred again on the ride here  Presently the patient is denying any of the symptoms and wants to go home  He stated that his bilateral vision was blurred well as happening  He denies any headaches or dizziness  He denies losing consciousness  Denies any prior occurrence of this  He denies any family history of strokes  He denies smoking but states he does chew tobacco     Review of Systems:    Review of Systems   Constitutional: Negative for appetite change, chills, diaphoresis, fatigue and fever  HENT: Negative for congestion, rhinorrhea and sore throat  Eyes: Negative for visual disturbance  Respiratory: Negative for cough, chest tightness, shortness of breath and wheezing  Cardiovascular: Negative for chest pain, palpitations and leg swelling  Gastrointestinal: Negative for abdominal pain, constipation, diarrhea, nausea and vomiting  Genitourinary: Negative for dysuria  Musculoskeletal: Negative for arthralgias and myalgias  Neurological: Positive for weakness and numbness  Negative for dizziness, syncope, light-headedness and headaches  All other systems reviewed and are negative  Past Medical and Surgical History:     Past Medical History:   Diagnosis Date    Anxiety     Hypertension        History reviewed  No pertinent surgical history  Meds/Allergies:    Prior to Admission medications    Medication Sig Start Date End Date Taking?  Authorizing Provider   ALPRAZolam Britney Lock) 0 25 mg tablet Take by mouth daily at bedtime as needed for anxiety   Yes Historical Provider, MD amLODIPine (NORVASC) 5 mg tablet Take 5 mg by mouth daily   Yes Historical Provider, MD     I have reviewed home medications with patient personally  Allergies: Allergies   Allergen Reactions    Morphine Other (See Comments)     Pt states "I get a red line up my arm "       Social History:     Marital Status: /Civil Union   Occupation: Noncontributory   Patient Pre-hospital Living Situation: Home  Patient Pre-hospital Level of Mobility: Full  Patient Pre-hospital Diet Restrictions: None  Substance Use History:   Social History     Substance and Sexual Activity   Alcohol Use Never    Frequency: Never     Social History     Tobacco Use   Smoking Status Never Smoker   Smokeless Tobacco Current User    Types: Chew     Social History     Substance and Sexual Activity   Drug Use Not on file       Family History:    History reviewed  No pertinent family history  Physical Exam:     Vitals:   Blood Pressure: (!) 180/70 (11/27/19 2115)  Pulse: 63 (11/27/19 2115)  Temperature: 97 8 °F (36 6 °C) (11/27/19 2115)  Temp Source: Oral (11/27/19 2115)  Respirations: 17 (11/27/19 2115)  Height: 5' 4" (162 6 cm) (11/27/19 2115)  Weight - Scale: 62 9 kg (138 lb 9 6 oz) (11/27/19 2115)  SpO2: 99 % (11/27/19 2115)    Physical Exam   Constitutional: He is oriented to person, place, and time  Vital signs are normal  He appears well-developed and well-nourished  Non-toxic appearance  No distress  HENT:   Head: Normocephalic and atraumatic  Mouth/Throat: Mucous membranes are not dry  Eyes: Pupils are equal, round, and reactive to light  Conjunctivae and EOM are normal  No scleral icterus  Pupils are equal    Neck: Neck supple  Carotid bruit is not present  Cardiovascular: Normal rate, regular rhythm, S1 normal, S2 normal, normal heart sounds and intact distal pulses  Exam reveals no S3 and no S4  No murmur heard  Pulmonary/Chest: Effort normal and breath sounds normal  No accessory muscle usage or stridor   No respiratory distress  He has no decreased breath sounds  He has no wheezes  He has no rhonchi  He has no rales  He exhibits no tenderness  Abdominal: Soft  Bowel sounds are normal  He exhibits no distension and no mass  There is no tenderness  There is no rigidity, no rebound and no guarding  Neurological: He is alert and oriented to person, place, and time  He has normal strength  He is not disoriented  He displays no tremor  No cranial nerve deficit or sensory deficit  He displays no seizure activity  GCS eye subscore is 4  GCS verbal subscore is 5  GCS motor subscore is 6  Patient has some diminished coordination with finger to nose testing in the right arm  Skin: Skin is warm and dry  Additional Data:     Lab Results: I have personally reviewed pertinent reports  Results from last 7 days   Lab Units 11/27/19  1900   WBC Thousand/uL 9 29   HEMOGLOBIN g/dL 16 1   HEMATOCRIT % 48 6   PLATELETS Thousands/uL 286   NEUTROS PCT % 66   LYMPHS PCT % 19   MONOS PCT % 9   EOS PCT % 4     Results from last 7 days   Lab Units 11/27/19  1900   SODIUM mmol/L 142   POTASSIUM mmol/L 4 0   CHLORIDE mmol/L 103   CO2 mmol/L 32   BUN mg/dL 20   CREATININE mg/dL 1 76*   ANION GAP mmol/L 7   CALCIUM mg/dL 9 1   GLUCOSE RANDOM mg/dL 133     Results from last 7 days   Lab Units 11/27/19  1900   INR  0 85                   Imaging: I have personally reviewed pertinent reports  CTA head and neck with and without contrast   Final Result by Jose Mcgowan DO (11/27 1959)      CT brain: No acute intracranial pathology  CT angiography: Approximately 65-70% focal stenosis at the origin of the right internal carotid artery  No intracranial atherosclerotic or occlusive disease  Workstation performed: [id number]         MRI Inpatient Order    (Results Pending)       EKG, Pathology, and Other Studies Reviewed on Admission:   · EKG: Not able to be reviewed    · CTA Head and Neck: No acute intracranial pathology  Approximately 65-70% focal stenosis at the origin of the right internal carotid artery  No intracranial atherosclerotic or occlusive disease     Allscripts / Epic Records Reviewed: Yes     ** Please Note: This note has been constructed using a voice recognition system   **

## 2019-11-28 NOTE — PROGRESS NOTES
Progress Note - Sherryle Basset 1945, 72 y o  male MRN: [id number]    Unit/Bed#: S -01 Encounter: [id number]    Primary Care Provider: DVAID Gonzalez   Date and time admitted to hospital: 11/27/2019  6:34 PM    * Right sided numbness  Assessment & Plan  · Multiple episodes prior to admission  Patient states that his right leg was numb, he could not move his right arm, and his right face felt like he went to the dentist and got numbed  On exam he has some coordination defects with finger-to-nose testing, but this is mild  Strength 5/5 in upper and lower extremities  CTA significant for R ICA stenosis 65%-70%, unclear significance at this time  · Changed from observation to inpatient as further workup is necessary and we request Neurology input  · There is concern that patient could be having emboli coming from the neck or thrombi from the heart, but workup is still required  · As a result, there is a medical necessity for having a patient transitioned to become inpatient status  · Neurology was consulted  · Stroke pathway  · Permissive HTN for now     Elevated serum creatinine  Assessment & Plan  · Unclear baseline, no prior to compare  Could be baseline if he has long standing hypertension   · Trend  · Consider nephrology consultation as patient may have underlying chronic kidney disease stage 3  · Request medical records from PCP  · Dr Lesvia Kemp  · Dr Jose D Jang    Essential hypertension  Assessment & Plan  · BP elevated on admission   Improved after home amlodipine  · Hold antihypertensive meds  · 5 mg hydralazine p r n  to be administered for systolic blood pressure greater than 160        VTE Pharmacologic Prophylaxis:   Pharmacologic: Heparin - patient initially refused but we convinced the patient that he requires heparin;  Mechanical VTE Prophylaxis in Place: No; patient ambulating without assistance around the room    Discussions with Specialists or Other Care Team Provider: Neurology-awaiting input    Education and Discussions with Family / Patient:  Wife and patient    Current Length of Stay: 0 day(s)    Current Patient Status: Inpatient     Discharge Plan / Estimated Discharge Date:  Likely in 24-48 hours-status changed to inpatient    Code Status: Level 1 - Full Code      Subjective: Today, the patient seemed very anxious and wanted to go home  I discussed with him our plan to likely keep him for at least another day until Neurology complete the workup as well as a full stroke workup pathway of echocardiogram, MRI of the brain, etc especially given his CTA finding of occlusion in the right internal carotid artery  Patient states he regularly follows up with his primary care physician and that he is no other medical history that could contribute to possible stroke symptoms  He currently denies any of these symptoms and feels that he does not need to stay in the hospital   After discussion with the attending as well as with the patient and wife, we were able to convince the patient that he needed to stay here for further workup  Review of Systems   Constitutional: Negative for activity change, appetite change and fever  HENT: Negative for congestion  Eyes: Negative for visual disturbance  Respiratory: Negative for cough, shortness of breath and wheezing  Cardiovascular: Negative for chest pain and leg swelling  Gastrointestinal: Negative for abdominal pain, constipation, diarrhea, nausea and vomiting  Genitourinary: Negative for decreased urine volume, difficulty urinating, dysuria, frequency and urgency  Musculoskeletal: Negative for arthralgias and myalgias  Skin: Negative for pallor and rash  Neurological: Negative for dizziness, syncope, weakness and light-headedness  Psychiatric/Behavioral: Positive for agitation  Negative for confusion  All other systems reviewed and are negative          Objective:     Vitals:   Temp (24hrs), Av 1 °F (36 7 °C), Min:97 5 °F (36 4 °C), Max:98 7 °F (37 1 °C)    Temp:  [97 5 °F (36 4 °C)-98 7 °F (37 1 °C)] 98 7 °F (37 1 °C)  HR:  [57-78] 57  Resp:  [16-18] 18  BP: (120-234)/() 143/74  SpO2:  [92 %-99 %] 92 %  Body mass index is 23 79 kg/m²  Input and Output Summary (last 24 hours): Intake/Output Summary (Last 24 hours) at 11/28/2019 1318  Last data filed at 11/28/2019 1231  Gross per 24 hour   Intake 180 ml   Output    Net 180 ml       Physical Exam:     Physical Exam   Constitutional: He is oriented to person, place, and time  He appears well-developed and well-nourished  Non-toxic appearance  No distress  He is not intubated  HENT:   Head: Normocephalic and atraumatic  Nose: Nose normal    Eyes: Pupils are equal, round, and reactive to light  Conjunctivae, EOM and lids are normal  No scleral icterus  Neck: Phonation normal  Neck supple  No thyroid mass and no thyromegaly present  Cardiovascular: Normal rate, regular rhythm, S1 normal, S2 normal, normal heart sounds, intact distal pulses and normal pulses  Exam reveals no gallop, no distant heart sounds, no friction rub and no decreased pulses  No murmur heard  No systolic murmur is present  No diastolic murmur is present  Pulmonary/Chest: Effort normal and breath sounds normal  No accessory muscle usage or stridor  He is not intubated  No respiratory distress  He has no decreased breath sounds  He has no wheezes  He has no rhonchi  He has no rales  He exhibits no mass and no tenderness  Abdominal: Soft  Normal appearance and bowel sounds are normal  He exhibits no distension and no mass  There is no tenderness  Musculoskeletal: Normal range of motion  He exhibits no edema  Lymphadenopathy:     He has no cervical adenopathy  Neurological: He is alert and oriented to person, place, and time  No cranial nerve deficit or sensory deficit  Coordination normal    Skin: Skin is warm, dry and intact  He is not diaphoretic     Psychiatric: He has a normal mood and affect  Thought content normal  His speech is delayed  He is agitated and slowed  Cognition and memory are normal  He expresses impulsivity  Nursing note and vitals reviewed  Additional Data:     Labs:    Results from last 7 days   Lab Units 11/28/19  0451 11/27/19  1900   WBC Thousand/uL 9 15 9 29   HEMOGLOBIN g/dL 15 2 16 1   HEMATOCRIT % 48 0 48 6   PLATELETS Thousands/uL 262 286   NEUTROS PCT %  --  66   LYMPHS PCT %  --  19   MONOS PCT %  --  9   EOS PCT %  --  4     Results from last 7 days   Lab Units 11/28/19  0451   POTASSIUM mmol/L 4 4   CHLORIDE mmol/L 105   CO2 mmol/L 29   BUN mg/dL 23   CREATININE mg/dL 1 71*   CALCIUM mg/dL 9 2     Results from last 7 days   Lab Units 11/27/19  1900   INR  0 85       * I Have Reviewed All Lab Data Listed Above  * Additional Pertinent Lab Tests Reviewed: [place] [id number] Admission Reviewed    Imaging:    Imaging Reports Reviewed Today Include:  CT of the head and neck, CT angiogram the head and neck  Imaging Personally Reviewed by Myself Includes:  None    Recent Cultures (last 7 days):           Last 24 Hours Medication List:     Current Facility-Administered Medications:  acetaminophen 650 mg Oral Q6H PRN Rayo Teague PA-C   ALPRAZolam 0 25 mg Oral BID PRN Brennan Lord DO   aspirin 81 mg Oral Daily Rayo Teague PA-C   atorvastatin 40 mg Oral QPM Rayo Teague PA-C   heparin (porcine) 5,000 Units Subcutaneous Q8H [facility] Rayo Teague PA-C   hydrALAZINE 5 mg Intravenous Q6H PRN Brennan Lord DO   ondansetron 4 mg Intravenous Q6H PRN Rayo Ramos PA-C        Today, Patient Was Seen By: Tex Millard DO    ** Please Note: This note has been constructed using a voice recognition system   **

## 2019-11-28 NOTE — QUICK NOTE
Patient grew impatient and frustrated about waiting for the MRI and was disappointed when he learned that he would not be having the MRI performed today  As a result, patient requested to leave against medical advice  Earlier today, he was noted to be refusing medications such as heparin for VT prophylaxis  After further discussion about risks of leaving against medical advice, patient continued that he wished to leave at the was frustrated that we could not guarantee a time for the MRI to be performed  He requested that if he was to stay that he could have coffee and chewing tobacco   I let the patient know that he could indeed have coffee but would only be offered a nicotine patch, which she declined  We discussed with him that we would be prescribing him the necessary medications such as aspirin Plavix and statin upon discharging if he was to leave against medical advice along with a prescription for an MRI to be done outpatient that he would be able to have performed  I spoke with the wife of the patient on the phone, and she was concerned that he was having a change in behavior the also noticed by the patient's son  After further questioning about the patient's medical history, other and that the patient had been admitted to [facility] a few years prior for belligerent behavior  The discussed with the patient's wife that it would be important to maintain a safe environment at home a with respect to weapons and the patient's wife stated that she is aware of the safety concerns and that she has kept the weapons under lock and key  Wife of the patient subsequently stated that she would be on her way to see the patient  While discussing further with the patient about the decision to leave against medical advice, the patient's wife and his son came to the room  Immediately, the patient put on issues and began to leave the room    A fall the patient now to the room in attempt to stop him and questioned him about his desire to leave  He stated that he would not like to discuss his health issues with his son present in the room and requested that he leave  After the son left, the patient returned to his room and additional discussion occurred between the wife and the patient regarding family matters  I excused herself from the room so that the patient and his wife could complete that discussion in privacy  Shortly after, I returned to the room and the patient stated that he would stay on the condition that he is guarantee an MRI examination tomorrow  A partially signed AMA form (without the patient's signature) has been maintained with us as there was concern that he would change his mind once again in the course of the night wish to leave AMA

## 2019-11-28 NOTE — CONSULTS
Consultation - Neurology   Matthew Trinh 72 y o  male MRN: [id number]  Unit/Bed#: S -01 Encounter: [id number]      Assessment/Plan   1)  Transient R sided numbness and RUE weakness - suspicious for TIA vs CVA  Potential that events occurred in setting of severe hypertension  -MRI brain pending   -CTA head and neck demonstrates approximately 65-70% focal stenosis at the origin of the right ICA   -tele   -echo pending   -HbA1c pending, lipid profile with cholesterol 204,     -Continue ASA and statin for now    -PT/ OT/ Speech   -Will continue to follow, please monitor exam and notify with changes       History of Present Illness     Reason for Consult / Principal Problem: Ischemic Stroke  Hx and PE limited by: none   HPI: Matthew Trinh is a 72 y o   male with hypertension who presents to the [address] [place] [address] Emergency Department with a chief complaint of right-sided numbness lasting approximately 5-6 minutes  The patient reports that during this time his entire right leg was numb, and he could not move his right arm  Right face was also involved  This occurred in 2 separate events  The 1st was on Monday  The patient was going to present to the emergency department, however he reports that there is a traffic jam" and told his wife to turn around and go home  No events on Tuesday  Yesterday, when today, the patient had a repeat event in late afternoon, his and his wife drove him to hospital   He reports that on the car ride, he began feeling somewhat lightheaded and that his wife was speaking to him and it sounded like he was in a tunnel  Reports blurred vision of the right eye at that time  Symptoms had largely resolved by the time the patient arrived at the emergency department  CTA head and neck completed and demonstrates approximately 65-70% focal stenosis at the origin of the right ICA    No additional hemodynamically significant stenosis, occlusion or dissection in the intracranial or extracranial neuro vasculature  Of note, patient hypertensive a 234/110 upon presentation  No repeat events since admission  On exam today, the patient is pacing in his room in no acute distress  He is irritable and somewhat uncooperative with exam   A 12 point review systems was completed and is negative today  Patient demonstrates a nonfocal neurological exam as detailed below  Inpatient consult to Neurology  Consult performed by: Nelson Bob PA-C  Consult ordered by: Dex Ferguson PA-C          Review of Systems   See HPI     Historical Information   Past Medical History:   Diagnosis Date    Anxiety     Hypertension      History reviewed  No pertinent surgical history  Social History   Social History     Substance and Sexual Activity   Alcohol Use Never    Frequency: Never     Social History     Substance and Sexual Activity   Drug Use Not on file     Social History     Tobacco Use   Smoking Status Never Smoker   Smokeless Tobacco Current User    Types: Chew     Family History: non-contributory    Review of previous medical records was completed  Meds/Allergies   Scheduled Meds:  Current Facility-Administered Medications:  acetaminophen 650 mg Oral Q6H PRN Rayo P Teague, PA-C   ALPRAZolam 0 25 mg Oral HS PRN Rayo Dewitte Yuly, PA-C   amLODIPine 5 mg Oral Daily Rayo Dewitte Yuly, PA-C   aspirin 81 mg Oral Daily Rayo Dewitte Yuly, PA-C   atorvastatin 40 mg Oral QPM Rayo P Zahida, PA-C   heparin (porcine) 5,000 Units Subcutaneous Q8H [address] Rayo P Teague, PA-C   ondansetron 4 mg Intravenous Q6H PRN Rayo P Teague, PA-C     Continuous Infusions:   PRN Meds:   acetaminophen    ALPRAZolam    ondansetron      Allergies   Allergen Reactions    Morphine Other (See Comments)     Pt states "I get a red line up my arm "       Objective   Vitals:Blood pressure 143/75, pulse 58, temperature 97 5 °F (36 4 °C), temperature source Oral, resp   rate 18, height 5' 4" (1 626 m), weight 62 9 kg (138 lb 9 6 oz), SpO2 98 % ,Body mass index is 23 79 kg/m²  No intake or output data in the 24 hours ending 11/28/19 0731    Invasive Devices: Invasive Devices     Peripheral Intravenous Line            Peripheral IV 11/27/19 Right Antecubital less than 1 day                Physical Exam   Constitutional: He is oriented to person, place, and time  He appears well-developed and well-nourished  No distress  HENT:   Head: Normocephalic and atraumatic  Right Ear: External ear normal    Left Ear: External ear normal    Mouth/Throat: Oropharynx is clear and moist  No oropharyngeal exudate  Eyes: Conjunctivae are normal  Right eye exhibits no discharge  Left eye exhibits no discharge  No scleral icterus  Neck: Normal range of motion  Neck supple  Pulmonary/Chest: Effort normal  No respiratory distress  Musculoskeletal: Normal range of motion  He exhibits no edema, tenderness or deformity  Neurological: He is oriented to person, place, and time  He has normal strength  He has a normal Finger-Nose-Finger Test and a normal Heel to [employer]  Gait normal    Skin: Skin is warm and dry  No rash noted  He is not diaphoretic  No erythema  No pallor  Psychiatric: His speech is normal    Odd affect  Irritable and rude   Nursing note and vitals reviewed  Neurologic Exam     Mental Status   Oriented to person, place, and time  Follows 2 step commands  Attention: normal  Concentration: normal    Speech: speech is normal   Level of consciousness: alert  Knowledge: good  Able to name object  Able to repeat  Normal comprehension  Cranial Nerves   Cranial nerves II through XII intact  Motor Exam   Muscle bulk: normal  Overall muscle tone: normal    Strength   Strength 5/5 throughout       Sensory Exam   Light touch normal      Gait, Coordination, and Reflexes     Gait  Gait: normal    Coordination   Finger to nose coordination: normal  Heel to shin coordination: normal    Tremor   Resting tremor: absent    Reflexes   Right plantar: normal  Left plantar: normal  Right ankle clonus: absent  Left ankle clonus: absent      Lab Results: I have personally reviewed pertinent reports       Recent Results (from the past 24 hour(s))   CBC and differential    Collection Time: 11/27/19  7:00 PM   Result Value Ref Range    WBC 9 29 4 31 - 10 16 Thousand/uL    RBC 5 38 3 88 - 5 62 Million/uL    Hemoglobin 16 1 12 0 - 17 0 g/dL    Hematocrit 48 6 36 5 - 49 3 %    MCV 90 82 - 98 fL    MCH 29 9 26 8 - 34 3 pg    MCHC 33 1 31 4 - 37 4 g/dL    RDW 13 9 11 6 - 15 1 %    MPV 9 5 8 9 - 12 7 fL    Platelets 263 207 - 430 Thousands/uL    nRBC 0 /100 WBCs    Neutrophils Relative 66 43 - 75 %    Immat GRANS % 1 0 - 2 %    Lymphocytes Relative 19 14 - 44 %    Monocytes Relative 9 4 - 12 %    Eosinophils Relative 4 0 - 6 %    Basophils Relative 1 0 - 1 %    Neutrophils Absolute 6 13 1 85 - 7 62 Thousands/µL    Immature Grans Absolute 0 06 0 00 - 0 20 Thousand/uL    Lymphocytes Absolute 1 75 0 60 - 4 47 Thousands/µL    Monocytes Absolute 0 84 0 17 - 1 22 Thousand/µL    Eosinophils Absolute 0 41 0 00 - 0 61 Thousand/µL    Basophils Absolute 0 10 0 00 - 0 10 Thousands/µL   Protime-INR    Collection Time: 11/27/19  7:00 PM   Result Value Ref Range    Protime 11 1 (L) 11 6 - 14 5 seconds    INR 0 85 0 84 - 1 19   APTT    Collection Time: 11/27/19  7:00 PM   Result Value Ref Range    PTT 29 23 - 37 seconds   Basic metabolic panel    Collection Time: 11/27/19  7:00 PM   Result Value Ref Range    Sodium 142 136 - 145 mmol/L    Potassium 4 0 3 5 - 5 3 mmol/L    Chloride 103 100 - 108 mmol/L    CO2 32 21 - 32 mmol/L    ANION GAP 7 4 - 13 mmol/L    BUN 20 5 - 25 mg/dL    Creatinine 1 76 (H) 0 60 - 1 30 mg/dL    Glucose 133 65 - 140 mg/dL    Calcium 9 1 8 3 - 10 1 mg/dL    eGFR 38 ml/min/1 73sq m   Troponin I    Collection Time: 11/27/19  7:00 PM   Result Value Ref Range    Troponin I <0 02 <=0 04 ng/mL   Magnesium    Collection Time: 11/27/19  7:00 PM   Result Value Ref Range    Magnesium 2 1 1 6 - 2 6 mg/dL   Lipid Panel with Direct LDL reflex    Collection Time: 11/28/19  4:51 AM   Result Value Ref Range    Cholesterol 204 (H) 50 - 200 mg/dL    Triglycerides 108 <=150 mg/dL    HDL, Direct 47 >=40 mg/dL    LDL Calculated 135 (H) 0 - 100 mg/dL   Basic metabolic panel    Collection Time: 11/28/19  4:51 AM   Result Value Ref Range    Sodium 141 136 - 145 mmol/L    Potassium 4 4 3 5 - 5 3 mmol/L    Chloride 105 100 - 108 mmol/L    CO2 29 21 - 32 mmol/L    ANION GAP 7 4 - 13 mmol/L    BUN 23 5 - 25 mg/dL    Creatinine 1 71 (H) 0 60 - 1 30 mg/dL    Glucose 127 65 - 140 mg/dL    Calcium 9 2 8 3 - 10 1 mg/dL    eGFR 39 ml/min/1 73sq m   CBC (With Platelets)    Collection Time: 11/28/19  4:51 AM   Result Value Ref Range    WBC 9 15 4 31 - 10 16 Thousand/uL    RBC 5 24 3 88 - 5 62 Million/uL    Hemoglobin 15 2 12 0 - 17 0 g/dL    Hematocrit 48 0 36 5 - 49 3 %    MCV 92 82 - 98 fL    MCH 29 0 26 8 - 34 3 pg    MCHC 31 7 31 4 - 37 4 g/dL    RDW 13 9 11 6 - 15 1 %    Platelets 913 347 - 091 Thousands/uL    MPV 9 3 8 9 - 12 7 fL   ]    Imaging Studies: I have personally reviewed pertinent reports  and I have personally reviewed pertinent films in PACS  EKG, Pathology, and Other Studies: I have personally reviewed pertinent reports      VTE Prophylaxis: Sequential compression device (Venodyne)  and Heparin SQ    Code Status: Level 1 - Full Code  Advance Directive and Living Will:      Power of :    POLST:

## 2019-11-28 NOTE — SPEECH THERAPY NOTE
Consult received and chart reviewed  Per chart review, and discussion with RN, pt passed RN dysphagia assessment and is tolerating regular diet with thin liquids with no s/s of aspiration  No dysarthria or aphasia symptoms reported  Therefore, formal speech/swallow evaluation not indicated at this time  If new concerns arise, please reconsult       JHONY Humphrey S , [id number] [facility]  Speech Language Pathologist   Available via [address] [place] [address] #[phone]  [place] #[id number]

## 2019-11-28 NOTE — ASSESSMENT & PLAN NOTE
· Multiple episodes prior to admission  Patient states that his right leg was numb, he could not move his right arm, and his right face felt like he went to the dentist and got numbed  On exam he has some coordination defects with finger-to-nose testing, but this is mild  Strength 5/5 in upper and lower extremities   CTA significant for R ICA stenosis 65%-70%, unclear significance at this time  · Changed from observation to inpatient as further workup is necessary and we request Neurology input  · There is concern that patient could be having emboli coming from the neck or thrombi from the heart, but workup is still required  · As a result, there is a medical necessity for having a patient transitioned to become inpatient status  · Neurology was consulted  · Stroke pathway  · Permissive HTN for now

## 2019-11-28 NOTE — ASSESSMENT & PLAN NOTE
· Unclear baseline, no prior to compare   Could be baseline if he has long standing hypertension   · Trend  · Consider nephrology consultation as patient may have underlying chronic kidney disease stage 3  · Request medical records from PCP  · Dr Melody Dowell  · Dr Isiah Martel

## 2019-11-28 NOTE — ASSESSMENT & PLAN NOTE
· Unclear baseline, no prior to compare   Could be baseline if he has long standing hypertension   · Trend  · Consider nephrology consultation

## 2019-11-28 NOTE — ASSESSMENT & PLAN NOTE
· Multiple episodes prior to admission  Patient states that his right leg was numb, he could not move his right arm, and his right face felt like he went to the dentist and got numbed  On exam he has some coordination defects with finger-to-nose testing, but this is mild  Strength 5/5 in upper and lower extremities   CTA significant for R ICA stenosis 65%-70%, unclear significance at this time  · Admit patient to med/surg under observation status   · Stroke pathway  · Permissive HTN for now

## 2019-11-29 ENCOUNTER — APPOINTMENT (INPATIENT)
Dept: MRI IMAGING | Facility: HOSPITAL | Age: 74
DRG: 079 | End: 2019-11-29
Payer: MEDICARE

## 2019-11-29 ENCOUNTER — APPOINTMENT (INPATIENT)
Dept: NON INVASIVE DIAGNOSTICS | Facility: HOSPITAL | Age: 74
DRG: 079 | End: 2019-11-29
Payer: MEDICARE

## 2019-11-29 VITALS
BODY MASS INDEX: 23.66 KG/M2 | TEMPERATURE: 98 F | RESPIRATION RATE: 16 BRPM | HEART RATE: 62 BPM | SYSTOLIC BLOOD PRESSURE: 138 MMHG | DIASTOLIC BLOOD PRESSURE: 68 MMHG | HEIGHT: 64 IN | WEIGHT: 138.6 LBS | OXYGEN SATURATION: 99 %

## 2019-11-29 PROBLEM — I65.21 CAROTID STENOSIS, RIGHT: Status: ACTIVE | Noted: 2019-11-29

## 2019-11-29 PROBLEM — G45.9 TIA (TRANSIENT ISCHEMIC ATTACK): Status: ACTIVE | Noted: 2019-11-27

## 2019-11-29 PROBLEM — I67.4 HYPERTENSIVE ENCEPHALOPATHY: Status: ACTIVE | Noted: 2019-11-27

## 2019-11-29 PROBLEM — I67.4 HYPERTENSIVE ENCEPHALOPATHY: Status: RESOLVED | Noted: 2019-11-27 | Resolved: 2019-11-29

## 2019-11-29 LAB
ANION GAP SERPL CALCULATED.3IONS-SCNC: 4 MMOL/L (ref 4–13)
ATRIAL RATE: 85 BPM
BUN SERPL-MCNC: 23 MG/DL (ref 5–25)
CALCIUM SERPL-MCNC: 9 MG/DL (ref 8.3–10.1)
CHLORIDE SERPL-SCNC: 107 MMOL/L (ref 100–108)
CO2 SERPL-SCNC: 31 MMOL/L (ref 21–32)
CREAT SERPL-MCNC: 1.38 MG/DL (ref 0.6–1.3)
ERYTHROCYTE [DISTWIDTH] IN BLOOD BY AUTOMATED COUNT: 14 % (ref 11.6–15.1)
GFR SERPL CREATININE-BSD FRML MDRD: 51 ML/MIN/1.73SQ M
GLUCOSE SERPL-MCNC: 87 MG/DL (ref 65–140)
HCT VFR BLD AUTO: 44.3 % (ref 36.5–49.3)
HGB BLD-MCNC: 14.5 G/DL (ref 12–17)
MCH RBC QN AUTO: 30.1 PG (ref 26.8–34.3)
MCHC RBC AUTO-ENTMCNC: 32.7 G/DL (ref 31.4–37.4)
MCV RBC AUTO: 92 FL (ref 82–98)
P AXIS: 65 DEGREES
PLATELET # BLD AUTO: 262 THOUSANDS/UL (ref 149–390)
PMV BLD AUTO: 9.6 FL (ref 8.9–12.7)
POTASSIUM SERPL-SCNC: 4.5 MMOL/L (ref 3.5–5.3)
PR INTERVAL: 142 MS
QRS AXIS: 27 DEGREES
QRSD INTERVAL: 90 MS
QT INTERVAL: 366 MS
QTC INTERVAL: 425 MS
RBC # BLD AUTO: 4.82 MILLION/UL (ref 3.88–5.62)
SODIUM SERPL-SCNC: 142 MMOL/L (ref 136–145)
T WAVE AXIS: 57 DEGREES
VENTRICULAR RATE: 81 BPM
WBC # BLD AUTO: 8.29 THOUSAND/UL (ref 4.31–10.16)

## 2019-11-29 PROCEDURE — G8979 MOBILITY GOAL STATUS: HCPCS

## 2019-11-29 PROCEDURE — 99239 HOSP IP/OBS DSCHRG MGMT >30: CPT | Performed by: HOSPITALIST

## 2019-11-29 PROCEDURE — 85027 COMPLETE CBC AUTOMATED: CPT | Performed by: INTERNAL MEDICINE

## 2019-11-29 PROCEDURE — 93010 ELECTROCARDIOGRAM REPORT: CPT | Performed by: INTERNAL MEDICINE

## 2019-11-29 PROCEDURE — 80048 BASIC METABOLIC PNL TOTAL CA: CPT | Performed by: INTERNAL MEDICINE

## 2019-11-29 PROCEDURE — 93306 TTE W/DOPPLER COMPLETE: CPT | Performed by: INTERNAL MEDICINE

## 2019-11-29 PROCEDURE — G8978 MOBILITY CURRENT STATUS: HCPCS

## 2019-11-29 PROCEDURE — 93306 TTE W/DOPPLER COMPLETE: CPT

## 2019-11-29 PROCEDURE — 70551 MRI BRAIN STEM W/O DYE: CPT

## 2019-11-29 PROCEDURE — 97163 PT EVAL HIGH COMPLEX 45 MIN: CPT

## 2019-11-29 RX ORDER — ONDANSETRON 4 MG/1
4 TABLET, FILM COATED ORAL EVERY 8 HOURS PRN
Qty: 20 TABLET | Refills: 0 | Status: SHIPPED | OUTPATIENT
Start: 2019-11-29

## 2019-11-29 RX ORDER — NICOTINE 21 MG/24HR
1 PATCH, TRANSDERMAL 24 HOURS TRANSDERMAL DAILY
Qty: 28 PATCH | Refills: 0 | Status: SHIPPED | OUTPATIENT
Start: 2019-11-30

## 2019-11-29 RX ORDER — ASPIRIN 81 MG/1
81 TABLET, CHEWABLE ORAL DAILY
Qty: 30 TABLET | Refills: 3 | Status: SHIPPED | OUTPATIENT
Start: 2019-11-30

## 2019-11-29 RX ORDER — ATORVASTATIN CALCIUM 40 MG/1
80 TABLET, FILM COATED ORAL EVERY EVENING
Qty: 30 TABLET | Refills: 0 | Status: SHIPPED | OUTPATIENT
Start: 2019-11-29 | End: 2019-12-11 | Stop reason: DRUGHIGH

## 2019-11-29 RX ORDER — ATORVASTATIN CALCIUM 40 MG/1
40 TABLET, FILM COATED ORAL EVERY EVENING
Qty: 30 TABLET | Refills: 3 | Status: SHIPPED | OUTPATIENT
Start: 2019-11-29 | End: 2019-11-29

## 2019-11-29 RX ORDER — AMLODIPINE BESYLATE 10 MG/1
10 TABLET ORAL DAILY
Qty: 30 TABLET | Refills: 3 | Status: SHIPPED | OUTPATIENT
Start: 2019-11-29

## 2019-11-29 RX ORDER — AMLODIPINE BESYLATE 5 MG/1
5 TABLET ORAL DAILY
Status: DISCONTINUED | OUTPATIENT
Start: 2019-11-29 | End: 2019-11-29 | Stop reason: HOSPADM

## 2019-11-29 RX ADMIN — HEPARIN SODIUM 5000 UNITS: 5000 INJECTION INTRAVENOUS; SUBCUTANEOUS at 05:12

## 2019-11-29 RX ADMIN — AMLODIPINE BESYLATE 5 MG: 5 TABLET ORAL at 08:51

## 2019-11-29 RX ADMIN — ASPIRIN 81 MG 81 MG: 81 TABLET ORAL at 08:51

## 2019-11-29 NOTE — ASSESSMENT & PLAN NOTE
CTA was significant for right internal carotid artery stenosis 65-70%  Per Neurology workup MRI and echo were negative for any signs of TIA or stroke  Suggestive of hypertensive encephalopathy  Neurology recommendations aspirin 81 every day, statin 40 mg q h s

## 2019-11-29 NOTE — PHYSICAL THERAPY NOTE
PHYSICAL THERAPY EVALUATION NOTE    Patient Name: Nghia Patel  ERQOP'N Date: 2019    AGE:   72 y o  Mrn:   [id number]  ADMIT DX:  TIA (transient ischemic attack) [G45 9]  Chest pain [R07 9]  Hypertension [I10]    Past Medical History:   Diagnosis Date    Anxiety     Hypertension      Length Of Stay: 1  PHYSICAL THERAPY EVALUATION :     19 0949   Note Type   Note type Eval only   Pain Assessment   Pain Assessment No/denies pain   Home Living   Type of [address] Two level;1/2 bath on main level;Stairs to enter without rails  (2 MAXINE)   Home Equipment Other (Comment)  (None)   Prior Function   Level of [place] Independent with ADLs and functional mobility   Lives With Spouse   ADL Assistance Independent   IADLs Independent   Falls in the last 6 months 0   Vocational Retired   Restrictions/Precautions   [place] Bearing Precautions Per Order No   Other Precautions Impulsive;Telemetry; Fall Risk;Hard of hearing  (Pt verbalizes frustrations with being in hospital)   General   Family/Caregiver Present No   Cognition   Orientation Level Oriented X4   Following Commands Follows one step commands with increased time or repetition   Comments Pt identified by full name and   He consents to PT evaluation  Throughout social history/command following, pt requires increased time to both respond or follow instructions       RUE Assessment   RUE Assessment WFL   RUE Strength   RUE Overall Strength Within Functional Limits - able to perform ADL tasks with strength  (via observation)   LUE Assessment   LUE Assessment WFL   LUE Strength   LUE Overall Strength Within Functional Limits - able to perform ADL tasks with strength  (via observation)   RLE Assessment   RLE Assessment WFL   Strength RLE   RLE Overall Strength   (at least 3/5 (via observation))   LLE Assessment   LLE Assessment WFL   Strength LLE   LLE Overall Strength   (at lest 3/5 (via observation))   Coordination   Sensation X  (Pt unable to follow instructions for sensation testing)   Transfers   Sit to Stand 6  Modified independent   Stand to Sit 6  Modified independent   Additional Comments 4 Item mDGI: 10/12 (see below for breakdown)   Ambulation/Elevation   Gait pattern Inconsistent shanice   Gait Assistance 5  Supervision   Additional items Assist x 1;Verbal cues  (for safety awareness)   Assistive Device None   Distance 160 ft   Stair Management Assistance 5  Supervision   Additional items Assist x 1;Verbal cues  (for safety awareness)   Stair Management Technique Alternating pattern; One rail L   Number of Stairs 12   Balance   Static Sitting Good   Static Standing Fair   Ambulatory Fair -   Activity Tolerance   Activity Tolerance Patient limited by fatigue  (Pt eager to order breakfast)   Nurse Made Aware Spoke to Elina Walker RN   Assessment   Prognosis Fair   Problem List Decreased strength;Decreased endurance; Impaired balance;Decreased mobility; Impaired judgement;Decreased safety awareness; Impaired hearing   Assessment Valentin Cronin is a 73 y/o male who [facility] w/ R-sided numbness on stroke pathway  Dx of TIA  Order placed for PT eval and tx, w/ activity order of up and OOB as tolerated and fall risk precautions  Pt presents w/ comorbidities of HTN, and personal factors of living in 2 story house, stair(s) to enter home, anxiety, hearing impairments, impulsivity and limited insight into impairments  Pt presents w/ weakness, decreased endurance, impaired balance, gait deviations, impaired hearing, decreased safety awareness, impaired judgment and fall risk   These impairments are evident in findings from physical examination (weakness), mobility assessment (need for supervision to I assist w/ all phases of mobility when usually mobilizing independently, tolerance to only 160 feet of ambulation, need for cueing for mobility technique and need for cueing for safety awareness with all phases of mobility and including stairs), and Barthel Index: 90/100 and 4 Item mDGI 10/12 (<10/12 is at risk for falls)  Pt needed input for task input and mobility technique/safety  Pt is at risk for falls due to physical and safety awareness deficits  Pt's clinical presentation is unstable/unpredictable (evident in poor blood pressure control, need for assist w/ all phases of mobility when usually mobilizing independently, tolerance to only 160 feet of ambulation and need for input for task focus and mobility technique)  Pt needs inpatient PT tx to improve mobility deficits  Discharge recommendation is for outpatient PT and home w/ family support in order to reduce fall risk and maximize level of functional independence  Recommend in upcoming sessions to continue to work on gait training, stair training, and higher level balance activities  Goals   Patient Goals "to go home"   [facility] Expiration Date 12/09/19   Short Term Goal #1 Patient will: Increase bilateral LE strength 1/2 grade to facilitate independent mobility, Ambulate at least 250 ft  without assistive device independently w/o LOB, Navigate a flight stairs independently with unilateral handrail to facilitate return to previous living environment, Increase all balance 1/2 grade to decrease risk for falls, Improve Barthel Index to 100 to reduce fall risk and increase independence and Improve 4 item mDGI score by 2 (12/12) points to decrease risk for falls and demonstrate improvement in higher level balance activities   PT Treatment Day 0   Plan   Treatment/Interventions LE strengthening/ROM; Elevations; Therapeutic exercise; Endurance training;Patient/family training;Equipment eval/education;Gait training   PT Frequency 2-3x/wk   Recommendation   Recommendation Outpatient PT   Equipment Recommended Other (Comment)  (None)   Barthel Index   Feeding 10   Bathing 5   Grooming Score 5   Dressing Score 10   Bladder Score 10   Bowels Score 10   Toilet Use Score 10 Transfers (Bed/Chair) Score 15   Mobility (Level Surface) Score 10   Stairs Score 5   Barthel Index Score 90     4-Item Dynamic Gait Index (mDGI)  3/3 Gait level surface  3/3 Change in gait speed  2/3 Gait with horizontal head turns  2/3 Gait with vertical head turns    10/12 Total score (less than 10/12 indicates increased risk of falls in elderly)     Skilled PT recommended while in hospital and upon DC to progress pt toward treatment goals       Kiet Mecrado, PT

## 2019-11-29 NOTE — ASSESSMENT & PLAN NOTE
· Multiple episodes prior to admission  Patient states that his right leg was numb, he could not move his right arm, and his right face felt like he went to the dentist and got numbed  On exam he has some coordination defects with finger-to-nose testing, but this is mild  Strength 5/5 in upper and lower extremities   CTA significant for R ICA stenosis 65%-70%, unclear significance at this time  · Changed from observation to inpatient as further workup is necessary and we request Neurology input  · There is concern that patient could be having emboli coming from the neck or thrombi from the heart, but workup is still required  · As a result, there is a medical necessity for having a patient transitioned to become inpatient status  · Neurology was consulted  · Stroke pathway  · Awaiting echo, MRI  · Resumed amlodipine 5 mg home medication as this is beyond a 24 hour period for permissive hypertension

## 2019-11-29 NOTE — PLAN OF CARE
Problem: NEUROSENSORY - ADULT  Goal: Achieves stable or improved neurological status  Description  INTERVENTIONS  - Monitor and report changes in neurological status  - Monitor vital signs such as temperature, blood pressure, glucose, and any other labs ordered   - Initiate measures to prevent increased intracranial pressure  - Monitor for seizure activity and implement precautions if appropriate      11/29/2019 1118 by Maye Gonzalez RN  Outcome: Completed  11/29/2019 1053 by Maye Gonzalez RN  Outcome: Progressing     Problem: CARDIOVASCULAR - ADULT  Goal: Absence of cardiac dysrhythmias or at baseline rhythm  Description  INTERVENTIONS:  - Continuous cardiac monitoring, vital signs, obtain 12 lead EKG if ordered  - Administer antiarrhythmic and heart rate control medications as ordered  - Monitor electrolytes and administer replacement therapy as ordered  11/29/2019 1118 by Maye Gonzalez RN  Outcome: Completed  11/29/2019 1053 by Maye Gonzalez RN  Outcome: Progressing     Problem: Neurological Deficit  Goal: Neurological status is stable or improving  Description  Interventions:  - Monitor and assess patient's level of consciousness, motor function, sensory function, and level of assistance needed for ADLs  - Monitor and report changes from baseline  Collaborate with interdisciplinary team to initiate plan and implement interventions as ordered  - Provide and maintain a safe environment  - Consider seizure precautions  - Consider fall precautions  - Consider aspiration precautions  - Consider bleeding precautions  11/29/2019 1118 by Maye Gonzalez RN  Outcome: Completed  11/29/2019 1053 by Maye Gonzalez RN  Outcome: Progressing     Problem: Activity Intolerance/Impaired Mobility  Goal: Mobility/activity is maintained at optimum level for patient  Description  Interventions:  - Assess and monitor patient  barriers to mobility and need for assistive/adaptive devices    - Assess patient's emotional response to limitations  - Collaborate with interdisciplinary team and initiate plans and interventions as ordered  - Encourage independent activity per ability   - Maintain proper body alignment  - Perform active/passive rom as tolerated/ordered  - Plan activities to conserve energy   - Turn patient as appropriate  11/29/2019 1118 by Kilo Gordon RN  Outcome: Completed  11/29/2019 1053 by Kilo Gordon RN  Outcome: Progressing     Problem: Communication Impairment  Goal: Ability to express needs and understand communication  Description  Assess patient's communication skills and ability to understand information  Patient will demonstrate use of effective communication techniques, alternative methods of communication and understanding even if not able to speak  - Encourage communication and provide alternate methods of communication as needed  - Collaborate with case management/ for discharge needs  - Include patient/family/caregiver in decisions related to communication    11/29/2019 1118 by Kilo Gordon RN  Outcome: Completed  11/29/2019 1053 by Kilo Gordon RN  Outcome: Progressing

## 2019-11-29 NOTE — ASSESSMENT & PLAN NOTE
· BP elevated on admission - patient with a history of essential hypertension that is being controlled on amlodipine 5 mg q d    · 5 mg hydralazine p r n  to be administered for systolic blood pressure greater than 160  · Resume home amlodipine as it is beyond the 24 hour time frame for permissive hypertension

## 2019-11-29 NOTE — ASSESSMENT & PLAN NOTE
· Unclear baseline, no prior to compare   Could be baseline if he has long standing hypertension   · Trend  · Consider nephrology consultation as patient may have underlying chronic kidney disease stage 3  · Request medical records from PCP  · Dr Nicole Camejo  · Dr Laura Salcido

## 2019-11-29 NOTE — ASSESSMENT & PLAN NOTE
· BP elevated on admission - patient with a history of essential hypertension that is being controlled on amlodipine 5 mg q d  · Since hypertensive encephalopathy suspected, recommend 10 mg of amlodipine q d

## 2019-11-29 NOTE — PROGRESS NOTES
Progress Note - Andrea Ortega 1945, 72 y o  male MRN: [id number]    Unit/Bed#: S -01 Encounter: [id number]    Primary Care Provider: DAVID Douglas   Date and time admitted to hospital: 11/27/2019  6:34 PM    * TIA (transient ischemic attack)  Assessment & Plan  · Multiple episodes prior to admission  Patient states that his right leg was numb, he could not move his right arm, and his right face felt like he went to the dentist and got numbed  On exam he has some coordination defects with finger-to-nose testing, but this is mild  Strength 5/5 in upper and lower extremities  CTA significant for R ICA stenosis 65%-70%, unclear significance at this time  · Changed from observation to inpatient as further workup is necessary and we request Neurology input  · There is concern that patient could be having emboli coming from the neck or thrombi from the heart, but workup is still required  · As a result, there is a medical necessity for having a patient transitioned to become inpatient status  · Neurology was consulted  · Stroke pathway  · Awaiting echo, MRI  · Resumed amlodipine 5 mg home medication as this is beyond a 24 hour period for permissive hypertension    Elevated serum creatinine  Assessment & Plan  · Unclear baseline, no prior to compare  Could be baseline if he has long standing hypertension   · Trend  · Consider nephrology consultation as patient may have underlying chronic kidney disease stage 3  · Request medical records from PCP  · Dr Urmila Sheppard  · Dr Carlos Hernandez    Essential hypertension  Assessment & Plan  · BP elevated on admission - patient with a history of essential hypertension that is being controlled on amlodipine 5 mg q d    · 5 mg hydralazine p r n  to be administered for systolic blood pressure greater than 160  · Resume home amlodipine as it is beyond the 24 hour time frame for permissive hypertension      VTE Pharmacologic Prophylaxis:   Pharmacologic: Heparin-patient was initially rejecting doses of heparin but now has been taking doses  Mechanical VTE Prophylaxis in Place: No    Discussions with Specialists or Other Care Team Provider:  Neurology    Education and Discussions with Family / Patient:  Wife    Current Length of Stay: 1 day(s)    Current Patient Status: Inpatient     Discharge Plan / Estimated Discharge Date:  24-48 hours    Code Status: Level 1 - Full Code      Subjective: Today, the patient was far less irritable, though it is worth noting that it was earlier in the morning and I had just woken him up from sleep  Nevertheless, I confirmed with him that our plan is to have the MRI and the echocardiogram done today  Patient expressed thanks and stated that he was hopeful that the exam results will come back quickly so that he could go home  Review of Systems   Constitutional: Negative for activity change, appetite change and fever  HENT: Negative for congestion  Eyes: Negative for visual disturbance  Respiratory: Negative for cough, shortness of breath and wheezing  Cardiovascular: Negative for chest pain and leg swelling  Gastrointestinal: Negative for abdominal pain, constipation, diarrhea, nausea and vomiting  Genitourinary: Negative for decreased urine volume, difficulty urinating, dysuria, frequency and urgency  Musculoskeletal: Negative for arthralgias and myalgias  Skin: Negative for pallor and rash  Neurological: Negative for dizziness, syncope, weakness and light-headedness  Psychiatric/Behavioral: Negative for agitation and confusion  All other systems reviewed and are negative  Objective:     Vitals:   Temp (24hrs), Av 9 °F (36 6 °C), Min:97 8 °F (36 6 °C), Max:98 °F (36 7 °C)    Temp:  [97 8 °F (36 6 °C)-98 °F (36 7 °C)] 98 °F (36 7 °C)  HR:  [57-86] 62  Resp:  [16-18] 16  BP: (138-177)/(68-86) 138/68  SpO2:  [98 %-99 %] 99 %  Body mass index is 23 79 kg/m²       Input and Output Summary (last 24 hours): Intake/Output Summary (Last 24 hours) at 11/29/2019 0818  Last data filed at 11/28/2019 1231  Gross per 24 hour   Intake 180 ml   Output    Net 180 ml       Physical Exam:     Physical Exam   Constitutional: He is oriented to person, place, and time  He appears well-developed and well-nourished  Non-toxic appearance  No distress  He is not intubated  HENT:   Head: Normocephalic and atraumatic  Nose: Nose normal    Eyes: Pupils are equal, round, and reactive to light  Conjunctivae, EOM and lids are normal  No scleral icterus  Neck: Phonation normal  Neck supple  Carotid bruit is not present  No thyroid mass and no thyromegaly present  Cardiovascular: Normal rate, regular rhythm, S1 normal, S2 normal, normal heart sounds, intact distal pulses and normal pulses  Exam reveals no gallop, no distant heart sounds, no friction rub and no decreased pulses  No murmur heard  No systolic murmur is present  No diastolic murmur is present  Pulmonary/Chest: Effort normal and breath sounds normal  No accessory muscle usage or stridor  He is not intubated  No respiratory distress  He has no decreased breath sounds  He has no wheezes  He has no rhonchi  He has no rales  He exhibits no mass and no tenderness  Abdominal: Soft  Normal appearance and bowel sounds are normal  He exhibits no distension and no mass  There is no tenderness  Musculoskeletal: Normal range of motion  He exhibits no edema  Lymphadenopathy:     He has no cervical adenopathy  Neurological: He is alert and oriented to person, place, and time  Skin: Skin is warm, dry and intact  He is not diaphoretic  Psychiatric: He has a normal mood and affect  Judgment and thought content normal  His speech is delayed  He is agitated  Cognition and memory are normal    Nursing note and vitals reviewed        Additional Data:     Labs:    Results from last 7 days   Lab Units 11/29/19  0435  11/27/19  1900   WBC Thousand/uL 8 29   < > 9 29 HEMOGLOBIN g/dL 14 5   < > 16 1   HEMATOCRIT % 44 3   < > 48 6   PLATELETS Thousands/uL 262   < > 286   NEUTROS PCT %  --   --  66   LYMPHS PCT %  --   --  19   MONOS PCT %  --   --  9   EOS PCT %  --   --  4    < > = values in this interval not displayed  Results from last 7 days   Lab Units 11/29/19  0435   POTASSIUM mmol/L 4 5   CHLORIDE mmol/L 107   CO2 mmol/L 31   BUN mg/dL 23   CREATININE mg/dL 1 38*   CALCIUM mg/dL 9 0     Results from last 7 days   Lab Units 11/27/19  1900   INR  0 85       * I Have Reviewed All Lab Data Listed Above  * Additional Pertinent Lab Tests Reviewed: Atiya 66 Admission Reviewed    Imaging:    Imaging Reports Reviewed Today Include:  None  Imaging Personally Reviewed by Myself Includes:  None    Recent Cultures (last 7 days):           Last 24 Hours Medication List:     Current Facility-Administered Medications:  acetaminophen 650 mg Oral Q6H PRN Rayo Teague PA-C   ALPRAZolam 0 25 mg Oral BID PRN Brennan Lord DO   amLODIPine 5 mg Oral Daily Brennan Lord,    aspirin 81 mg Oral Daily Rayo Teague PA-C   atorvastatin 40 mg Oral QPM Rayo Teague PA-C   heparin (porcine) 5,000 Units Subcutaneous Q8H [facility] Rayo Teague PA-C   hydrALAZINE 5 mg Intravenous Q4H PRN Luana Castro PA-C   nicotine 21 mg Transdermal Daily Brennan Lord,    ondansetron 4 mg Intravenous Q6H PRN Rayo Reyes PA-C        Today, Patient Was Seen By: Kennedy Alvarez DO    ** Please Note: This note has been constructed using a voice recognition system   **

## 2019-11-29 NOTE — ASSESSMENT & PLAN NOTE
· Unclear baseline, no prior to compare   Could be baseline if he has long standing hypertension   · Trend  · Consider nephrology consultation as patient may have underlying chronic kidney disease stage 3  · Follow-up with PCP  · Dr Lesvia Kemp  · Dr Jose D Jang

## 2019-11-29 NOTE — PLAN OF CARE
Problem: PHYSICAL THERAPY ADULT  Goal: Performs mobility at highest level of function for planned discharge setting  See evaluation for individualized goals  Description  Treatment/Interventions: LE strengthening/ROM, Elevations, Therapeutic exercise, Endurance training, Patient/family training, Equipment eval/education, Gait training  Equipment Recommended: Other (Comment)(None)       See flowsheet documentation for full assessment, interventions and recommendations  Outcome: Progressing  Note:   Prognosis: Fair  Problem List: Decreased strength, Decreased endurance, Impaired balance, Decreased mobility, Impaired judgement, Decreased safety awareness, Impaired hearing  Assessment: Marjorie Randall is a 73 y/o male who [facility] w/ R-sided numbness on stroke pathway  Dx of TIA  Order placed for PT eval and tx, w/ activity order of up and OOB as tolerated and fall risk precautions  Pt presents w/ comorbidities of HTN, and personal factors of living in 2 story house, stair(s) to enter home, anxiety, hearing impairments, impulsivity and limited insight into impairments  Pt presents w/ weakness, decreased endurance, impaired balance, gait deviations, impaired hearing, decreased safety awareness, impaired judgment and fall risk  These impairments are evident in findings from physical examination (weakness), mobility assessment (need for supervision to I assist w/ all phases of mobility when usually mobilizing independently, tolerance to only 160 feet of ambulation, need for cueing for mobility technique and need for cueing for safety awareness with all phases of mobility and including stairs), and Barthel Index: 90/100 and 4 Item mDGI 10/12 (<10/12 is at risk for falls)  Pt needed input for task input and mobility technique/safety  Pt is at risk for falls due to physical and safety awareness deficits   Pt's clinical presentation is unstable/unpredictable (evident in poor blood pressure control, need for assist w/ all phases of mobility when usually mobilizing independently, tolerance to only 160 feet of ambulation and need for input for task focus and mobility technique)  Pt needs inpatient PT tx to improve mobility deficits  Discharge recommendation is for outpatient PT and home w/ family support in order to reduce fall risk and maximize level of functional independence  Recommend in upcoming sessions to continue to work on gait training, stair training, and higher level balance activities  Recommendation: Outpatient PT          See flowsheet documentation for full assessment

## 2019-11-29 NOTE — DISCHARGE SUMMARY
Discharge- Glenis Le 1945, 72 y o  male MRN: [id number]    Unit/Bed#: S -01 Encounter: [id number]    Primary Care Provider: Inge Hayes   Date and time admitted to hospital: 11/27/2019  6:34 PM    * Hypertensive encephalopathyresolved as of 11/29/2019  Assessment & Plan  · Multiple episodes prior to admission  Patient states that his right leg was numb, he could not move his right arm, and his right face felt like he went to the dentist and got numbed  On exam he has some coordination defects with finger-to-nose testing, but this is mild  Strength 5/5 in upper and lower extremities  CTA significant for R ICA stenosis 65%-70%, unclear significance at this time  · Changed from observation to inpatient as further workup is necessary and we request Neurology input  · There is concern that patient could be having emboli coming from the neck or thrombi from the heart, but workup is still required  · As a result, there is a medical necessity for having a patient transitioned to become inpatient status  · Neurology was consulted  · Stroke pathway  · Awaiting echo, MRI  · Change amlodipine dose to 10 mg qhs  · Continue other home medications as prescribed  · Discharge on ASA and Statin per Neurology  · Physical therapy recommended outpatient PT    Carotid stenosis, right  Assessment & Plan  CTA was significant for right internal carotid artery stenosis 65-70%  Per Neurology workup MRI and echo were negative for any signs of TIA or stroke  Suggestive of hypertensive encephalopathy  Neurology recommendations aspirin 81 every day, statin 40 mg q h s  Elevated serum creatinine  Assessment & Plan  · Unclear baseline, no prior to compare   Could be baseline if he has long standing hypertension   · Trend  · Consider nephrology consultation as patient may have underlying chronic kidney disease stage 3  · Follow-up with PCP  · Dr Melody Dowell  · Dr Isiah Martel    Essential hypertension  Assessment & Plan  · BP elevated on admission - patient with a history of essential hypertension that is being controlled on amlodipine 5 mg q d  · Since hypertensive encephalopathy suspected, recommend 10 mg of amlodipine q d  Discharging Resident Physician: Dora Bolivar DO  Attending: Trevon Seaman MD  PCP: Inge Gonzalez  Admission Date: 11/27/2019  Discharge Date: 11/29/19    Disposition:     Home    Reason for Admission:  Stroke-like symptoms    Consultations During Hospital Stay:  · Neurology    Procedures Performed:     · None    Significant Findings / Test Results:     · CT angiography showed approximately 65-70% focal stenosis at the origin of the right internal carotid artery with no evidence for intracranial atherosclerotic or occlusive disease or acute intracranial pathology  · MRI of the brain showed no acute infarction with minimal nonspecific cerebral white matter signal abnormality which is suggestive of migraines versus chronic microangiopathic disease-hypertensive encephalopathy  There is also evidence of inflammatory paranasal sinus disease    Incidental Findings:   · None     Test Results Pending at Discharge (will require follow up): · None     Outpatient Tests Requested:  · None    Complications:  No complications    Hospital Course:     Sherryle Basset is a 72 y o  male patient with past medical history of hypertension who originally presented to the hospital on 11/27/2019 due to right-sided numbness  He has reported sudden right-sided numbness episodes that lasted around 5-6 minutes  In addition, he noted that his upper and lower extremities were numb in addition to his right side of the face  In addition, he reported blurry vision Symptoms resolved upon arrival to the ER  He has no prior history of strokes nor does he have a family history of strokes    He denies smoking history although he does chew tobacco    The following day after admission, patient was reported to be extremely irritable and noted to be short with his wife and son  He grew frustrated that he was not getting the MRI examination performed in time given the holiday schedule and threatened to leave against medical advice on multiple occasions  He refused heparin administration on multiple times and subsequently gave consent to receive heparin once we had a discussion with him regarding the risks of not receiving heparin for VTE prophylaxis  In addition, he expressed frustration that he was not getting his hypertension medications since he was hospitalized, despite us explaining to him that as there was the concern for possible stroke, protocol required us to allow for permissive hypertension  Patient continued to threaten to leave against medical advice and once the wife returned to speak with the patient, he finally decided to stay an additional night so that he could receive the appropriate imaging studies  The following day, MRI results showed no acute infarction of the brain with minimal nonspecific cerebral white matter signal abnormality, suggestive of hypertensive encephalopathy  Echocardiogram revealed ejection fraction of left ventricle at 60%  Patient was given the appropriate discharge instructions and was discharged on aspirin 81 to be taken every day, statin 40 mg to be taken every day at night, and his hypertensive medication of amlodipine 5 mg every night was changed to amlodipine 10 mg every night  Patient was resumed on his home medications of Xanax  Condition at Discharge: stable     Discharge Day Visit / Exam:     * Please refer to separate progress note for these details *    Discussion with Family:  Wife    Discharge instructions/Information to patient and family:   See after visit summary for information provided to patient and family  Provisions for Follow-Up Care:  See after visit summary for information related to follow-up care and any pertinent home health orders        Planned Readmission:  None     Discharge Medications:  See after visit summary for reconciled discharge medications provided to patient and family        ** Please Note: This note has been constructed using a voice recognition system **

## 2019-11-29 NOTE — ASSESSMENT & PLAN NOTE
· Multiple episodes prior to admission  Patient states that his right leg was numb, he could not move his right arm, and his right face felt like he went to the dentist and got numbed  On exam he has some coordination defects with finger-to-nose testing, but this is mild  Strength 5/5 in upper and lower extremities   CTA significant for R ICA stenosis 65%-70%, unclear significance at this time  · Changed from observation to inpatient as further workup is necessary and we request Neurology input  · There is concern that patient could be having emboli coming from the neck or thrombi from the heart, but workup is still required  · As a result, there is a medical necessity for having a patient transitioned to become inpatient status  · Neurology was consulted  · Stroke pathway  · Awaiting echo, MRI  · Change amlodipine dose to 10 mg qhs  · Continue other home medications as prescribed  · Discharge on ASA and Statin per Neurology  · Physical therapy recommended outpatient PT

## 2019-11-29 NOTE — PROGRESS NOTES
Patient BP manual was 168/70  Patient was demanding blood pressure medication  Called [facility] and made them aware of situation  Was told not to give BP medication unless SBP was >200 given admitting diagnosis (r/o stroke)  Pt upset and requested to speak with supervisor  Supervisor made aware

## 2019-11-29 NOTE — SOCIAL WORK
LOS: 1  Readmission: No  Bundle: No    Consult(s): None at this time  Met with pt to introduce Cm services and complete assessment  Pt had his wife present and verbalized agreement with personal interview with them present  Pt reports emergency contacts are as follows and verbalized agreement with staff calling them as needed:  1  Wife Danielle Lopez [phone]    Pt reported the following:     Lives with/where: his wife in [place] home   House/apt and MAXINE: 2 story house with 2 MAXINE, no rails  Pt reports able to use steps without issue   Bedroom/bathroom layout: 1/2 bath on 1st floor, then full bath and bedroom on 2nd floor   Independent PTA: Yes, indep with ADLS PTA   Support and help at home from: wife is supportive   DME at home: None  Pt denies need for any   Other supplies for medical condition: Wife has BP cuff   Current or hx of VNA: No but reports wife had VNA after open heart surgery   Current or hx of PT/rehab: No  Pt confirmed RN did give pt OP PT Rx and list of [facility] PT/OT office locations  Pt aware to f/u with OP services   Transportation: Pt and wife have cars and drive; wife will transport on d/c    Mental health: Currently in OP treatment with PCP for anxiety and gets meds  No hx IP treatment   Substance use: Denies   PCP: [facility] [phone] with Dr vadim Fernandez  And Corry Gross   Work/school/retired: Retired and collects pension and social security   Insurance: [employer] Rx benefits: Yes and able to afford all Rx copays   Preferred [facility]: [facility] POA/LW: pt denies having any formal paperwork but identifies his wife Cintia Mccormick as health care rep  Pt declines POA/LW info at this time       CM reviewed discharge planning process including the following: identifying caregivers at home, preference for d/c planning needs, availability of [facility] Meds to Bed program, availability of treatment team to discuss questions or concerns patient and/or family may have regarding diagnosis, plan of care, old or new medications and discharge planning  CM will continue to follow for care coordination and update assessment as appropriate  Per [facility], pt is cleared for d/c home today  Recommended for OP PT and pt was given Rx and location guide  Wife to transport home  No other CM needs noted

## 2019-12-04 NOTE — PHYSICIAN ADVISOR
Current patient class: Inpatient  The patient is currently on Hospital Day: 3 at [facility]      The patient was admitted to the hospital  on 11/28/19 at 476 1626 for the following diagnosis:  TIA (transient ischemic attack) [G45 9]  Chest pain [R07 9]  Hypertension [I10]     After review of the relevant documentation, labs, vital signs and test results, the patient is a provider liable case and is most appropriate for OBSERVATION STATUS  In this particular case the patient was admitted to the hospital as an inpatient  The patient however fails to satisfy the 2 midnight benchmark and closer scrutiny of the case is warranted  After review of the patient presentation and relevant labs the patient was most appropriate for observation status on admission  Given that this patient has already been discharged prior to this review they become a provider liable case  Rationale is as follows: The patient is a 72 yrs   Male who presented to the ED at 11/27/2019  6:34 PM with a chief complaint of Numbness (Pt c/o intermittent right sided numbness and chest pain since monday  Denies CP and numbness at this time  )  Patient came in for a TIA rule out  When the patient came in the patient did have hypertensive crisis  However that did resolve and the patient did not get any IV medications  There was a delay in 1 day and getting the MRI and the workup has been negative  Given the negative workup in a patient who came in with TIA like symptoms with no active acute inpatient management aside from monitoring the patient is observation status appropriate  This case is provider liable      The patients vitals on arrival were ED Triage Vitals [11/27/19 1840]   Temperature Pulse Respirations Blood Pressure SpO2   97 7 °F (36 5 °C) 78 18 (!) 234/110 98 %      Temp Source Heart Rate Source Patient Position - Orthostatic VS BP Location FiO2 (%)   Oral Monitor Lying Left arm --      Pain Score No Pain           Past Medical History:   Diagnosis Date    Anxiety     Hypertension      History reviewed  No pertinent surgical history  Consults have been placed to:   IP CONSULT TO NEUROLOGY  IP CONSULT TO CASE MANAGEMENT  IP CONSULT TO NUTRITION SERVICES    Vitals:    11/28/19 1811 11/28/19 2015 11/29/19 0015 11/29/19 0415   BP: 142/70 168/70 150/70 138/68   BP Location: Left arm Left arm Left arm Left arm   Pulse: 64 86 72 62   Resp:  18 18 16   Temp:  98 °F (36 7 °C)     TempSrc:  Oral     SpO2:  99%     Weight:       Height:           Most recent labs:    No results for input(s): WBC, HGB, HCT, PLT, K, NA, CALCIUM, BUN, CREATININE, LIPASE, AMYLASE, INR, TROPONINI, CKTOTAL, AST, ALT, ALKPHOS, BILITOT in the last 72 hours  Scheduled Meds:  Continuous Infusions:  No current facility-administered medications for this encounter  PRN Meds:      Surgical procedures (if appropriate):

## 2019-12-11 DIAGNOSIS — I65.21 CAROTID STENOSIS, RIGHT: Primary | ICD-10-CM

## 2019-12-11 RX ORDER — ATORVASTATIN CALCIUM 40 MG/1
40 TABLET, FILM COATED ORAL DAILY
Qty: 30 TABLET | Refills: 2 | Status: SHIPPED | OUTPATIENT
Start: 2019-12-11

## 2019-12-11 NOTE — PROGRESS NOTES
Attempted to contact the patient regarding medication dosages adjustments for the statin that was prescribed during prior hospitalization and discharge  There was concern as the initial prescription was denied by insurance  There was confusion regarding appropriate dosage as we had recommended 40 mg of atorvastatin to be taken every day but 80 mg was prescribed to be taken as 2- 40 mg tablets  Number provided was of spouse, Jonel Walter, who answered the phone and stated that her , the patient, was not available to speak  I discussed with her the clarification for the dosages and that her  should be taking 40 mg every day  Spouse mentioned that she does not know how much or if her  is taking the dose as she does not "look over his shoulder"  I stressed to the patient's wife that it would be important for her to let her  know that he should take the appropriate dose of 40 mg of atorvastatin every day